# Patient Record
Sex: MALE | Race: OTHER | NOT HISPANIC OR LATINO
[De-identification: names, ages, dates, MRNs, and addresses within clinical notes are randomized per-mention and may not be internally consistent; named-entity substitution may affect disease eponyms.]

---

## 2018-07-18 ENCOUNTER — APPOINTMENT (OUTPATIENT)
Dept: INTERNAL MEDICINE | Facility: CLINIC | Age: 58
End: 2018-07-18
Payer: MEDICAID

## 2018-07-18 ENCOUNTER — NON-APPOINTMENT (OUTPATIENT)
Age: 58
End: 2018-07-18

## 2018-07-18 ENCOUNTER — LABORATORY RESULT (OUTPATIENT)
Age: 58
End: 2018-07-18

## 2018-07-18 VITALS
RESPIRATION RATE: 20 BRPM | DIASTOLIC BLOOD PRESSURE: 102 MMHG | OXYGEN SATURATION: 97 % | SYSTOLIC BLOOD PRESSURE: 150 MMHG

## 2018-07-18 VITALS — HEART RATE: 68 BPM

## 2018-07-18 VITALS — DIASTOLIC BLOOD PRESSURE: 110 MMHG | SYSTOLIC BLOOD PRESSURE: 152 MMHG

## 2018-07-18 VITALS — WEIGHT: 315 LBS | HEIGHT: 68 IN | BODY MASS INDEX: 47.74 KG/M2

## 2018-07-18 DIAGNOSIS — Z78.9 OTHER SPECIFIED HEALTH STATUS: ICD-10-CM

## 2018-07-18 DIAGNOSIS — Z12.5 ENCOUNTER FOR SCREENING FOR MALIGNANT NEOPLASM OF PROSTATE: ICD-10-CM

## 2018-07-18 DIAGNOSIS — Z82.49 FAMILY HISTORY OF ISCHEMIC HEART DISEASE AND OTHER DISEASES OF THE CIRCULATORY SYSTEM: ICD-10-CM

## 2018-07-18 DIAGNOSIS — Z72.3 LACK OF PHYSICAL EXERCISE: ICD-10-CM

## 2018-07-18 DIAGNOSIS — Z12.11 ENCOUNTER FOR SCREENING FOR MALIGNANT NEOPLASM OF COLON: ICD-10-CM

## 2018-07-18 PROCEDURE — 99203 OFFICE O/P NEW LOW 30 MIN: CPT | Mod: 25

## 2018-07-18 PROCEDURE — 93000 ELECTROCARDIOGRAM COMPLETE: CPT

## 2018-07-18 PROCEDURE — 99386 PREV VISIT NEW AGE 40-64: CPT | Mod: 25

## 2018-07-18 PROCEDURE — 36415 COLL VENOUS BLD VENIPUNCTURE: CPT

## 2018-07-18 NOTE — PHYSICAL EXAM
[No Acute Distress] : no acute distress [Well Nourished] : well nourished [Well Developed] : well developed [Well-Appearing] : well-appearing [Normal Sclera/Conjunctiva] : normal sclera/conjunctiva [PERRL] : pupils equal round and reactive to light [EOMI] : extraocular movements intact [Normal Outer Ear/Nose] : the outer ears and nose were normal in appearance [Normal Oropharynx] : the oropharynx was normal [Normal TMs] : both tympanic membranes were normal [Normal Nasal Mucosa] : the nasal mucosa was normal [No JVD] : no jugular venous distention [Supple] : supple [No Lymphadenopathy] : no lymphadenopathy [Thyroid Normal, No Nodules] : the thyroid was normal and there were no nodules present [No Respiratory Distress] : no respiratory distress  [Clear to Auscultation] : lungs were clear to auscultation bilaterally [No Accessory Muscle Use] : no accessory muscle use [Normal Rate] : normal rate  [Regular Rhythm] : with a regular rhythm [Normal S1, S2] : normal S1 and S2 [No Murmur] : no murmur heard [No Carotid Bruits] : no carotid bruits [No Abdominal Bruit] : a ~M bruit was not heard ~T in the abdomen [No Palpable Aorta] : no palpable aorta [Soft] : abdomen soft [Non Tender] : non-tender [Non-distended] : non-distended [No Masses] : no abdominal mass palpated [No HSM] : no HSM [Normal Bowel Sounds] : normal bowel sounds [Normal Posterior Cervical Nodes] : no posterior cervical lymphadenopathy [Normal Anterior Cervical Nodes] : no anterior cervical lymphadenopathy [Normal Inguinal Nodes] : no inguinal lymphadenopathy [No CVA Tenderness] : no CVA  tenderness [No Spinal Tenderness] : no spinal tenderness [No Joint Swelling] : no joint swelling [Grossly Normal Strength/Tone] : grossly normal strength/tone [No Rash] : no rash [Normal Gait] : normal gait [Coordination Grossly Intact] : coordination grossly intact [No Focal Deficits] : no focal deficits [Deep Tendon Reflexes (DTR)] : deep tendon reflexes were 2+ and symmetric [Speech Grossly Normal] : speech grossly normal [Memory Grossly Normal] : memory grossly normal [Normal Affect] : the affect was normal [Alert and Oriented x3] : oriented to person, place, and time [Normal Mood] : the mood was normal [Normal Insight/Judgement] : insight and judgment were intact [de-identified] : ? occ pause [de-identified] : +++ le edema, rt > lt [de-identified] : nl penis, rt testicular swelling.

## 2018-07-18 NOTE — ASSESSMENT
[FreeTextEntry1] : Discussed with the patient health care maintenance.  \par Discussed age and condition appropriate vaccinations.  \par Discussed age appropriate cancer screening testing as indicated, including colon cancer screening/colonoscopy, prostate cancer screening/PSA testing, testicular cancer screening/self exam and skin cancer screening.  \par Discussed protection from the sun.  \par Discussed healthy diet, exercise and weight control for health.\par Discussed healthy habits including abstaining from smoking and drugs and abstention/moderation with alcohol.\par Discussed routine regular ophthalmology and dental follow up.\par Routine labs discussed with the patient and sent. \par \par Spent 30 min face to face with the pt discussing and evaluating the htn, edema, fatigue, testicular swelling in addition to the time spent on the annual  PE and related probs.

## 2018-07-18 NOTE — HISTORY OF PRESENT ILLNESS
[FreeTextEntry1] : PE, obesity, fatigue, edema, testicular sx, glaucoma [de-identified] : 58 u/o male with a hx of obesity - s/p bariatric surg in the past, hx borderline htn and dm, glaucoma s/p surgery in the past here to establish care. \par  Asking for annual PE\par with some c/o.  reports le edema for 5-6 years.  Had been given  diuretics in the past.  Denies any bruce, pnd, orthopnea, cp, dizziness, palpitations.  no noted coughing or wheezing.  no le pain.  no noted weakness or numbness.  + occ le cramps.  some improvement with pedaling exercise bike.\par c/o fatigue.  reports that it is associated with the le edema.  Has been having for ~ 2 years.  constant.\par + wt gain.\par Reports intermittent rt testicular swelling and assoc tenderness.  no noted urinary sx.  \par no fevers, chills, sweats.  \par with some issues with eyes - has appt with ophtho\par with occ issues with the rt ear.  intermittent dec will.  \par \par prost-  overdue\par colon - overdue

## 2018-07-18 NOTE — REVIEW OF SYSTEMS
[Fatigue] : fatigue [Recent Change In Weight] : ~T recent weight change [Vision Problems] : vision problems [Hearing Loss] : hearing loss [Lower Ext Edema] : lower extremity edema [Depression] : depression [Negative] : Heme/Lymph [Fever] : no fever [Chills] : no chills [Night Sweats] : no night sweats [Discharge] : no discharge [Pain] : no pain [Redness] : no redness [Dryness] : no dryness [Itching] : no itching [Earache] : no earache [Nosebleeds] : no nosebleeds [Postnasal Drip] : no postnasal drip [Nasal Discharge] : no nasal discharge [Sore Throat] : no sore throat [Hoarseness] : no hoarseness [Chest Pain] : no chest pain [Palpitations] : no palpitations [Claudication] : no  leg claudication [Orthopena] : no orthopnea [Paroysmal Nocturnal Dyspnea] : no paroysmal nocturnal dyspnea [Skin Rash] : no skin rash [FreeTextEntry8] : testicular sx as noted. [FreeTextEntry9] : cramps as noted. [FreeTextEntry1] : no noted cold/heat intol.  no polyuria, polydipsia, polyphagia

## 2018-07-18 NOTE — HEALTH RISK ASSESSMENT
[No falls in past year] : Patient reported no falls in the past year [0] : 1) Little interest or pleasure doing things: Not at all (0) [1] : 2) Feeling down, depressed, or hopeless for several days (1) [None] : None [With Family] : lives with family [Employed] : employed [] :  [Feels Safe at Home] : Feels safe at home [Fully functional (bathing, dressing, toileting, transferring, walking, feeding)] : Fully functional (bathing, dressing, toileting, transferring, walking, feeding) [Fully functional (using the telephone, shopping, preparing meals, housekeeping, doing laundry, using] : Fully functional and needs no help or supervision to perform IADLs (using the telephone, shopping, preparing meals, housekeeping, doing laundry, using transportation, managing medications and managing finances) [Reports changes in hearing] : Reports changes in hearing [Reports changes in vision] : Reports changes in vision [Smoke Detector] : smoke detector [Carbon Monoxide Detector] : carbon monoxide detector [Seat Belt] :  uses seat belt [] : No [[id number]] : 1 [Change in mental status noted] : No change in mental status noted [Reports changes in dental health] : Reports no changes in dental health [Sunscreen] : does not use sunscreen

## 2018-07-19 ENCOUNTER — RESULT REVIEW (OUTPATIENT)
Age: 58
End: 2018-07-19

## 2018-07-23 LAB
25(OH)D3 SERPL-MCNC: 17.6 NG/ML
ALBUMIN SERPL ELPH-MCNC: 3.8 G/DL
ALP BLD-CCNC: 76 U/L
ALT SERPL-CCNC: 18 U/L
ANION GAP SERPL CALC-SCNC: 10 MMOL/L
AST SERPL-CCNC: 21 U/L
BASOPHILS # BLD AUTO: 0.01 K/UL
BASOPHILS NFR BLD AUTO: 0.1 %
BILIRUB SERPL-MCNC: 0.3 MG/DL
BUN SERPL-MCNC: 11 MG/DL
CALCIUM SERPL-MCNC: 8.2 MG/DL
CHLORIDE SERPL-SCNC: 100 MMOL/L
CHOLEST SERPL-MCNC: 197 MG/DL
CHOLEST/HDLC SERPL: 2.7 RATIO
CO2 SERPL-SCNC: 29 MMOL/L
CREAT SERPL-MCNC: 0.84 MG/DL
EOSINOPHIL # BLD AUTO: 0.2 K/UL
EOSINOPHIL NFR BLD AUTO: 2.6 %
FOLATE SERPL-MCNC: 8.7 NG/ML
GLUCOSE SERPL-MCNC: 112 MG/DL
HBA1C MFR BLD HPLC: 6.7 %
HCT VFR BLD CALC: 35.2 %
HDLC SERPL-MCNC: 74 MG/DL
HGB BLD-MCNC: 9.7 G/DL
IMM GRANULOCYTES NFR BLD AUTO: 0.1 %
IRON SATN MFR SERPL: 5 %
IRON SERPL-MCNC: 21 UG/DL
LDLC SERPL CALC-MCNC: 108 MG/DL
LYMPHOCYTES # BLD AUTO: 1.96 K/UL
LYMPHOCYTES NFR BLD AUTO: 26 %
MAGNESIUM SERPL-MCNC: 2.1 MG/DL
MAN DIFF?: NORMAL
MCHC RBC-ENTMCNC: 21.3 PG
MCHC RBC-ENTMCNC: 27.6 GM/DL
MCV RBC AUTO: 77.2 FL
MONOCYTES # BLD AUTO: 0.61 K/UL
MONOCYTES NFR BLD AUTO: 8.1 %
NEUTROPHILS # BLD AUTO: 4.76 K/UL
NEUTROPHILS NFR BLD AUTO: 63.1 %
PLATELET # BLD AUTO: 204 K/UL
POTASSIUM SERPL-SCNC: 4.9 MMOL/L
PROT SERPL-MCNC: 7 G/DL
PSA SERPL-MCNC: 0.19 NG/ML
RBC # BLD: 4.56 M/UL
RBC # FLD: 20.7 %
SODIUM SERPL-SCNC: 139 MMOL/L
TIBC SERPL-MCNC: 443 UG/DL
TRIGL SERPL-MCNC: 76 MG/DL
TSH SERPL-ACNC: 3.05 UIU/ML
UIBC SERPL-MCNC: 422 UG/DL
VIT B12 SERPL-MCNC: 200 PG/ML
WBC # FLD AUTO: 7.55 K/UL

## 2018-08-01 ENCOUNTER — APPOINTMENT (OUTPATIENT)
Dept: INTERNAL MEDICINE | Facility: CLINIC | Age: 58
End: 2018-08-01

## 2018-08-05 ENCOUNTER — APPOINTMENT (OUTPATIENT)
Dept: INTERNAL MEDICINE | Facility: CLINIC | Age: 58
End: 2018-08-05
Payer: MEDICAID

## 2018-08-05 VITALS — RESPIRATION RATE: 20 BRPM | DIASTOLIC BLOOD PRESSURE: 92 MMHG | HEART RATE: 76 BPM | SYSTOLIC BLOOD PRESSURE: 140 MMHG

## 2018-08-05 VITALS — BODY MASS INDEX: 47.74 KG/M2 | WEIGHT: 315 LBS | HEIGHT: 68 IN

## 2018-08-05 DIAGNOSIS — H40.9 UNSPECIFIED GLAUCOMA: ICD-10-CM

## 2018-08-05 PROCEDURE — 99214 OFFICE O/P EST MOD 30 MIN: CPT

## 2018-08-05 RX ORDER — MULTIVIT-MIN/FOLIC/VIT K/LYCOP 400-300MCG
50 MCG TABLET ORAL
Qty: 1 | Refills: 1 | Status: ACTIVE | COMMUNITY
Start: 2018-08-05 | End: 1900-01-01

## 2018-08-05 RX ORDER — FERROUS SULFATE TAB EC 325 MG (65 MG FE EQUIVALENT) 325 (65 FE) MG
325 (65 FE) TABLET DELAYED RESPONSE ORAL
Qty: 90 | Refills: 1 | Status: ACTIVE | COMMUNITY
Start: 2018-08-05 | End: 1900-01-01

## 2018-08-05 NOTE — PHYSICAL EXAM
[No Acute Distress] : no acute distress [Well Nourished] : well nourished [Well Developed] : well developed [Well-Appearing] : well-appearing [Normal Sclera/Conjunctiva] : normal sclera/conjunctiva [PERRL] : pupils equal round and reactive to light [EOMI] : extraocular movements intact [Normal Outer Ear/Nose] : the outer ears and nose were normal in appearance [Normal Oropharynx] : the oropharynx was normal [No JVD] : no jugular venous distention [Supple] : supple [No Lymphadenopathy] : no lymphadenopathy [Thyroid Normal, No Nodules] : the thyroid was normal and there were no nodules present [No Respiratory Distress] : no respiratory distress  [Clear to Auscultation] : lungs were clear to auscultation bilaterally [No Accessory Muscle Use] : no accessory muscle use [Normal Rate] : normal rate  [Regular Rhythm] : with a regular rhythm [Normal S1, S2] : normal S1 and S2 [No Murmur] : no murmur heard [No Carotid Bruits] : no carotid bruits [No Abdominal Bruit] : a ~M bruit was not heard ~T in the abdomen [No Palpable Aorta] : no palpable aorta [Soft] : abdomen soft [Non Tender] : non-tender [Non-distended] : non-distended [No Masses] : no abdominal mass palpated [No HSM] : no HSM [Normal Bowel Sounds] : normal bowel sounds [Normal Posterior Cervical Nodes] : no posterior cervical lymphadenopathy [Normal Anterior Cervical Nodes] : no anterior cervical lymphadenopathy [No CVA Tenderness] : no CVA  tenderness [No Spinal Tenderness] : no spinal tenderness [No Joint Swelling] : no joint swelling [Grossly Normal Strength/Tone] : grossly normal strength/tone [No Rash] : no rash [Normal Gait] : normal gait [Coordination Grossly Intact] : coordination grossly intact [No Focal Deficits] : no focal deficits [Deep Tendon Reflexes (DTR)] : deep tendon reflexes were 2+ and symmetric [Speech Grossly Normal] : speech grossly normal [Memory Grossly Normal] : memory grossly normal [Normal Affect] : the affect was normal [Alert and Oriented x3] : oriented to person, place, and time [Normal Mood] : the mood was normal [Normal Insight/Judgement] : insight and judgment were intact [de-identified] : ? occ pause [de-identified] : +++ le edema, rt > lt

## 2018-08-05 NOTE — REVIEW OF SYSTEMS
[Fatigue] : fatigue [Lower Ext Edema] : lower extremity edema [Negative] : Heme/Lymph [Fever] : no fever [Chills] : no chills [Night Sweats] : no night sweats [Recent Change In Weight] : ~T no recent weight change [Chest Pain] : no chest pain [Palpitations] : no palpitations [Claudication] : no  leg claudication [Orthopena] : no orthopnea [Paroysmal Nocturnal Dyspnea] : no paroysmal nocturnal dyspnea

## 2018-08-05 NOTE — HISTORY OF PRESENT ILLNESS
[FreeTextEntry1] : htn, gerd, obesity, Dm, glaucoma, edema, low iron, b12, and vit D [de-identified] : 58 u/o male with a hx of obesity - s/p bariatric surg in the past, hx htn and dm, glaucoma s/p surgery, edema, gerd.\par Started on HCTZ at last visit.\par Was found to have iron, b12 and vit d def on labs.\par also found to have a1c in the DM range.\par Has been taking hctz with some improvement of the le edema.\par Has not really changed the diet and exercise.\par no polyuria, polydipsia, polyphagia\par no noted cv sx.  no neuro sx\par GERD remains controlled.

## 2018-08-13 ENCOUNTER — APPOINTMENT (OUTPATIENT)
Dept: ULTRASOUND IMAGING | Facility: CLINIC | Age: 58
End: 2018-08-13

## 2018-08-28 ENCOUNTER — FORM ENCOUNTER (OUTPATIENT)
Age: 58
End: 2018-08-28

## 2018-08-29 ENCOUNTER — APPOINTMENT (OUTPATIENT)
Dept: ULTRASOUND IMAGING | Facility: CLINIC | Age: 58
End: 2018-08-29
Payer: MEDICAID

## 2018-08-29 ENCOUNTER — OUTPATIENT (OUTPATIENT)
Dept: OUTPATIENT SERVICES | Facility: HOSPITAL | Age: 58
LOS: 1 days | End: 2018-08-29
Payer: MEDICAID

## 2018-08-29 DIAGNOSIS — N50.89 OTHER SPECIFIED DISORDERS OF THE MALE GENITAL ORGANS: ICD-10-CM

## 2018-08-29 PROCEDURE — 76870 US EXAM SCROTUM: CPT

## 2018-08-29 PROCEDURE — 76870 US EXAM SCROTUM: CPT | Mod: 26

## 2018-09-05 ENCOUNTER — APPOINTMENT (OUTPATIENT)
Dept: INTERNAL MEDICINE | Facility: CLINIC | Age: 58
End: 2018-09-05
Payer: MEDICAID

## 2018-09-07 ENCOUNTER — APPOINTMENT (OUTPATIENT)
Dept: INTERNAL MEDICINE | Facility: CLINIC | Age: 58
End: 2018-09-07
Payer: MEDICAID

## 2018-09-07 ENCOUNTER — LABORATORY RESULT (OUTPATIENT)
Age: 58
End: 2018-09-07

## 2018-09-07 VITALS — HEART RATE: 80 BPM | DIASTOLIC BLOOD PRESSURE: 90 MMHG | SYSTOLIC BLOOD PRESSURE: 130 MMHG | RESPIRATION RATE: 14 BRPM

## 2018-09-07 VITALS — BODY MASS INDEX: 47.74 KG/M2 | WEIGHT: 315 LBS | HEIGHT: 68 IN

## 2018-09-07 PROCEDURE — 99214 OFFICE O/P EST MOD 30 MIN: CPT | Mod: 25

## 2018-09-07 PROCEDURE — 36415 COLL VENOUS BLD VENIPUNCTURE: CPT

## 2018-09-09 LAB
ALBUMIN SERPL ELPH-MCNC: 3.8 G/DL
ALP BLD-CCNC: 76 U/L
ALT SERPL-CCNC: 17 U/L
ANION GAP SERPL CALC-SCNC: 12 MMOL/L
AST SERPL-CCNC: 19 U/L
BASOPHILS # BLD AUTO: 0.05 K/UL
BASOPHILS NFR BLD AUTO: 0.9 %
BILIRUB SERPL-MCNC: 0.3 MG/DL
BUN SERPL-MCNC: 13 MG/DL
CALCIUM SERPL-MCNC: 8.8 MG/DL
CHLORIDE SERPL-SCNC: 101 MMOL/L
CO2 SERPL-SCNC: 27 MMOL/L
CREAT SERPL-MCNC: 0.89 MG/DL
EOSINOPHIL # BLD AUTO: 0.24 K/UL
EOSINOPHIL NFR BLD AUTO: 4.3 %
FERRITIN SERPL-MCNC: 27 NG/ML
GLUCOSE SERPL-MCNC: 98 MG/DL
HCT VFR BLD CALC: 37.4 %
HGB BLD-MCNC: 10.5 G/DL
IRON SATN MFR SERPL: 5 %
IRON SERPL-MCNC: 20 UG/DL
LYMPHOCYTES # BLD AUTO: 1.62 K/UL
LYMPHOCYTES NFR BLD AUTO: 28.4 %
MAN DIFF?: NORMAL
MCHC RBC-ENTMCNC: 22.6 PG
MCHC RBC-ENTMCNC: 28.1 GM/DL
MCV RBC AUTO: 80.6 FL
MONOCYTES # BLD AUTO: 0.15 K/UL
MONOCYTES NFR BLD AUTO: 2.6 %
NEUTROPHILS # BLD AUTO: 3.63 K/UL
NEUTROPHILS NFR BLD AUTO: 63.8 %
PLATELET # BLD AUTO: 202 K/UL
POTASSIUM SERPL-SCNC: 5 MMOL/L
PROT SERPL-MCNC: 7.1 G/DL
RBC # BLD: 4.64 M/UL
RBC # FLD: 23.2 %
SODIUM SERPL-SCNC: 140 MMOL/L
TIBC SERPL-MCNC: 412 UG/DL
UIBC SERPL-MCNC: 392 UG/DL
VIT B12 SERPL-MCNC: 241 PG/ML
WBC # FLD AUTO: 5.69 K/UL

## 2018-09-09 NOTE — CONSULT LETTER
[Dear  ___] : Dear  [unfilled], [Consult Letter:] : I had the pleasure of evaluating your patient, [unfilled]. [Please see my note below.] : Please see my note below. [Consult Closing:] : Thank you very much for allowing me to participate in the care of this patient.  If you have any questions, please do not hesitate to contact me. [FreeTextEntry1] : Andry Cabello Md

## 2018-09-09 NOTE — ASSESSMENT
[Other: _____] : [unfilled] [As per surgery] : as per surgery [Patient Optimized for Surgery] : Patient optimized for surgery [FreeTextEntry4] : 58 u/o male with a hx of obesity - s/p bariatric surg in the past, hx htn and dm, glaucoma s/p surgery, edema, gerd, b12 def, vit D def, iron def.\par Has been taking the meds and supplementation.  \par He will be having upcoming surgery for retinal detachment on the right.  \par diet has been a bit better.  Has been exercising - going on exercise bike.  \par Some wt loss.  \par no noted cv sx except the improved le edema.  no cp, palpitations, dizziness, bruce, pnd, orthopnea.  Has been riding on exercise bike.  Has been walknig a lot better\par no noted bleeding, bruising, clotting probs.  \par His blood pressure is improved, but remains high.  The blood pressure meds have been adjusted.\par follow up labs were sent including cbc and cmp given the recent change in meds.\par If cbc and cmp acceptable, there would be no medical contraindications for the planed procedure.

## 2018-09-09 NOTE — HISTORY OF PRESENT ILLNESS
[FreeTextEntry1] : repair of retinal detachment [FreeTextEntry2] : 9/17/18 [FreeTextEntry3] : Dr Polk [FreeTextEntry4] : 58 u/o male with a hx of obesity - s/p bariatric surg in the past, hx htn and dm, glaucoma s/p surgery, edema, gerd, b12 def, vit D def, iron def.\par Has been taking the meds and supplementation.  \par He will be having upcoming surgery for retinal detachment on the right.  With continued blurry vision at the right eye.  \par diet has been a bit better.  Has been exercising.  \par Some wt loss.  \par no polyuria, polydipsia, polyphagia\par no noted cv sx except the improved le edema.  no cp, palpitations, dizziness, bruce, pnd, orthopnea.  Has been riding on exercise bike.  Has been walknig a lot better\par no noted bleeding, bruising, clotting probs.  \par no neuro sx\par GERD remains controlled.

## 2018-09-09 NOTE — PHYSICAL EXAM
[No Acute Distress] : no acute distress [Well Nourished] : well nourished [Well Developed] : well developed [Well-Appearing] : well-appearing [Normal Sclera/Conjunctiva] : normal sclera/conjunctiva [PERRL] : pupils equal round and reactive to light [EOMI] : extraocular movements intact [Normal Outer Ear/Nose] : the outer ears and nose were normal in appearance [Normal Oropharynx] : the oropharynx was normal [No JVD] : no jugular venous distention [Supple] : supple [No Lymphadenopathy] : no lymphadenopathy [Thyroid Normal, No Nodules] : the thyroid was normal and there were no nodules present [No Respiratory Distress] : no respiratory distress  [Clear to Auscultation] : lungs were clear to auscultation bilaterally [No Accessory Muscle Use] : no accessory muscle use [Normal Rate] : normal rate  [Regular Rhythm] : with a regular rhythm [Normal S1, S2] : normal S1 and S2 [No Murmur] : no murmur heard [No Carotid Bruits] : no carotid bruits [No Abdominal Bruit] : a ~M bruit was not heard ~T in the abdomen [No Palpable Aorta] : no palpable aorta [Soft] : abdomen soft [Non Tender] : non-tender [Non-distended] : non-distended [No Masses] : no abdominal mass palpated [No HSM] : no HSM [Normal Bowel Sounds] : normal bowel sounds [Normal Posterior Cervical Nodes] : no posterior cervical lymphadenopathy [Normal Anterior Cervical Nodes] : no anterior cervical lymphadenopathy [No CVA Tenderness] : no CVA  tenderness [No Spinal Tenderness] : no spinal tenderness [No Joint Swelling] : no joint swelling [Grossly Normal Strength/Tone] : grossly normal strength/tone [Normal Gait] : normal gait [Coordination Grossly Intact] : coordination grossly intact [No Focal Deficits] : no focal deficits [Deep Tendon Reflexes (DTR)] : deep tendon reflexes were 2+ and symmetric [Speech Grossly Normal] : speech grossly normal [Memory Grossly Normal] : memory grossly normal [Normal Affect] : the affect was normal [Alert and Oriented x3] : oriented to person, place, and time [Normal Mood] : the mood was normal [Normal Insight/Judgement] : insight and judgment were intact [de-identified] : +++ le edema, rt > lt [de-identified] : cyst at the rt abd wall.  no tenderness or erythema.  No drainage.

## 2018-09-09 NOTE — REVIEW OF SYSTEMS
[Vision Problems] : vision problems [Lower Ext Edema] : lower extremity edema [Negative] : Heme/Lymph [Fever] : no fever [Chills] : no chills [Fatigue] : no fatigue [Night Sweats] : no night sweats [Recent Change In Weight] : ~T no recent weight change [Discharge] : no discharge [Pain] : no pain [Redness] : no redness [Dryness] : no dryness [Itching] : no itching [Chest Pain] : no chest pain [Palpitations] : no palpitations [Claudication] : no  leg claudication [Orthopena] : no orthopnea [Paroysmal Nocturnal Dyspnea] : no paroysmal nocturnal dyspnea

## 2018-10-08 ENCOUNTER — APPOINTMENT (OUTPATIENT)
Dept: CHRONIC DISEASE MANAGEMENT | Facility: CLINIC | Age: 58
End: 2018-10-08

## 2018-10-16 ENCOUNTER — APPOINTMENT (OUTPATIENT)
Dept: CARDIOLOGY | Facility: CLINIC | Age: 58
End: 2018-10-16
Payer: MEDICAID

## 2018-10-16 ENCOUNTER — NON-APPOINTMENT (OUTPATIENT)
Age: 58
End: 2018-10-16

## 2018-10-16 VITALS
BODY MASS INDEX: 47.74 KG/M2 | OXYGEN SATURATION: 98 % | SYSTOLIC BLOOD PRESSURE: 150 MMHG | WEIGHT: 315 LBS | HEIGHT: 68 IN | HEART RATE: 59 BPM | DIASTOLIC BLOOD PRESSURE: 90 MMHG

## 2018-10-16 DIAGNOSIS — Z82.49 FAMILY HISTORY OF ISCHEMIC HEART DISEASE AND OTHER DISEASES OF THE CIRCULATORY SYSTEM: ICD-10-CM

## 2018-10-16 PROCEDURE — 99204 OFFICE O/P NEW MOD 45 MIN: CPT

## 2018-10-16 PROCEDURE — 93000 ELECTROCARDIOGRAM COMPLETE: CPT

## 2018-10-19 ENCOUNTER — APPOINTMENT (OUTPATIENT)
Dept: CARDIOLOGY | Facility: CLINIC | Age: 58
End: 2018-10-19
Payer: MEDICAID

## 2018-10-19 PROCEDURE — 93306 TTE W/DOPPLER COMPLETE: CPT

## 2018-11-07 ENCOUNTER — APPOINTMENT (OUTPATIENT)
Dept: INTERNAL MEDICINE | Facility: CLINIC | Age: 58
End: 2018-11-07

## 2018-11-13 ENCOUNTER — APPOINTMENT (OUTPATIENT)
Dept: ELECTROPHYSIOLOGY | Facility: CLINIC | Age: 58
End: 2018-11-13

## 2018-11-16 ENCOUNTER — RX RENEWAL (OUTPATIENT)
Age: 58
End: 2018-11-16

## 2018-12-03 ENCOUNTER — APPOINTMENT (OUTPATIENT)
Dept: PULMONOLOGY | Facility: CLINIC | Age: 58
End: 2018-12-03

## 2019-01-14 ENCOUNTER — RX RENEWAL (OUTPATIENT)
Age: 59
End: 2019-01-14

## 2019-02-04 ENCOUNTER — APPOINTMENT (OUTPATIENT)
Dept: INTERNAL MEDICINE | Facility: CLINIC | Age: 59
End: 2019-02-04
Payer: MEDICAID

## 2019-02-04 VITALS — WEIGHT: 315 LBS | BODY MASS INDEX: 47.74 KG/M2 | HEIGHT: 68 IN

## 2019-02-04 VITALS — HEART RATE: 58 BPM | RESPIRATION RATE: 12 BRPM | SYSTOLIC BLOOD PRESSURE: 124 MMHG | DIASTOLIC BLOOD PRESSURE: 80 MMHG

## 2019-02-04 PROCEDURE — 36415 COLL VENOUS BLD VENIPUNCTURE: CPT

## 2019-02-04 PROCEDURE — 99214 OFFICE O/P EST MOD 30 MIN: CPT | Mod: 25

## 2019-02-04 PROCEDURE — 90674 CCIIV4 VAC NO PRSV 0.5 ML IM: CPT

## 2019-02-04 PROCEDURE — G0008: CPT

## 2019-02-04 NOTE — PHYSICAL EXAM
[No Acute Distress] : no acute distress [Well Nourished] : well nourished [Well Developed] : well developed [Well-Appearing] : well-appearing [Normal Sclera/Conjunctiva] : normal sclera/conjunctiva [PERRL] : pupils equal round and reactive to light [EOMI] : extraocular movements intact [Normal Outer Ear/Nose] : the outer ears and nose were normal in appearance [Normal Oropharynx] : the oropharynx was normal [No JVD] : no jugular venous distention [Supple] : supple [No Lymphadenopathy] : no lymphadenopathy [Thyroid Normal, No Nodules] : the thyroid was normal and there were no nodules present [No Respiratory Distress] : no respiratory distress  [Clear to Auscultation] : lungs were clear to auscultation bilaterally [No Accessory Muscle Use] : no accessory muscle use [Normal Rate] : normal rate  [Regular Rhythm] : with a regular rhythm [Normal S1, S2] : normal S1 and S2 [No Murmur] : no murmur heard [No Carotid Bruits] : no carotid bruits [No Abdominal Bruit] : a ~M bruit was not heard ~T in the abdomen [No Palpable Aorta] : no palpable aorta [Soft] : abdomen soft [Non Tender] : non-tender [Non-distended] : non-distended [No Masses] : no abdominal mass palpated [No HSM] : no HSM [Normal Bowel Sounds] : normal bowel sounds [Normal Posterior Cervical Nodes] : no posterior cervical lymphadenopathy [Normal Anterior Cervical Nodes] : no anterior cervical lymphadenopathy [No CVA Tenderness] : no CVA  tenderness [No Spinal Tenderness] : no spinal tenderness [No Joint Swelling] : no joint swelling [Grossly Normal Strength/Tone] : grossly normal strength/tone [Normal Gait] : normal gait [Coordination Grossly Intact] : coordination grossly intact [No Focal Deficits] : no focal deficits [Deep Tendon Reflexes (DTR)] : deep tendon reflexes were 2+ and symmetric [Speech Grossly Normal] : speech grossly normal [Memory Grossly Normal] : memory grossly normal [Normal Affect] : the affect was normal [Alert and Oriented x3] : oriented to person, place, and time [Normal Mood] : the mood was normal [Normal Insight/Judgement] : insight and judgment were intact [de-identified] : 2-3+le edema

## 2019-02-04 NOTE — HEALTH RISK ASSESSMENT
[0] : 1) Little interest or pleasure doing things: Not at all (0) [1] : 2) Feeling down, depressed, or hopeless for several days (1) [] : No [[id number]] : 0

## 2019-02-04 NOTE — HISTORY OF PRESENT ILLNESS
[FreeTextEntry1] : dm, htn, gerd, obesity, edema, b12/iron/vit d def [de-identified] : 58 u/o male with a hx of obesity - s/p bariatric surg in the past, hx htn and dm, glaucoma s/p surgery, edema, gerd, b12 def, vit D def, iron def.\par Has been taking the meds and supplementation. \par He had surgery for retinal detachment on the right. With continued blurry vision at the right eye. Wants to change ophtho.\par diet has been a bit better. Has been exercising with cycling nightly.\par Some wt loss. \par no polyuria, polydipsia, polyphagia\par no noted cv sx except the improved le edema. no cp, palpitations, dizziness, bruce, pnd, orthopnea. \par no noted bleeding, bruising. \par no neuro sx\par GERD remains controlled. \par Has been having some knee/ankle pains.  \par occ feels depressed when sitting alone in the store.  situational - no noted anhedonia.\par \par for flu vaccine\par \par colon - has never had.

## 2019-02-04 NOTE — REVIEW OF SYSTEMS
[Vision Problems] : vision problems [Lower Ext Edema] : lower extremity edema [Joint Pain] : joint pain [Negative] : Heme/Lymph [Fever] : no fever [Chills] : no chills [Fatigue] : no fatigue [Night Sweats] : no night sweats [Recent Change In Weight] : ~T no recent weight change [Discharge] : no discharge [Pain] : no pain [Redness] : no redness [Dryness] : no dryness [Itching] : no itching [Chest Pain] : no chest pain [Palpitations] : no palpitations [Claudication] : no  leg claudication [Orthopena] : no orthopnea [Paroysmal Nocturnal Dyspnea] : no paroysmal nocturnal dyspnea [Joint Stiffness] : no joint stiffness [Muscle Pain] : no muscle pain [Muscle Weakness] : no muscle weakness [Back Pain] : no back pain [Joint Swelling] : no joint swelling [Suicidal] : not suicidal

## 2019-02-06 LAB
25(OH)D3 SERPL-MCNC: 25.9 NG/ML
ALBUMIN SERPL ELPH-MCNC: 3.9 G/DL
ALP BLD-CCNC: 73 U/L
ALT SERPL-CCNC: 16 U/L
ANION GAP SERPL CALC-SCNC: 10 MMOL/L
AST SERPL-CCNC: 18 U/L
BASOPHILS # BLD AUTO: 0.03 K/UL
BASOPHILS NFR BLD AUTO: 0.4 %
BILIRUB SERPL-MCNC: 0.2 MG/DL
BUN SERPL-MCNC: 17 MG/DL
CALCIUM SERPL-MCNC: 8.8 MG/DL
CHLORIDE SERPL-SCNC: 97 MMOL/L
CHOLEST SERPL-MCNC: 219 MG/DL
CHOLEST/HDLC SERPL: 3.4 RATIO
CO2 SERPL-SCNC: 30 MMOL/L
CREAT SERPL-MCNC: 0.93 MG/DL
CREAT SPEC-SCNC: 77 MG/DL
EOSINOPHIL # BLD AUTO: 0.26 K/UL
EOSINOPHIL NFR BLD AUTO: 3.5 %
FERRITIN SERPL-MCNC: 29 NG/ML
FOLATE SERPL-MCNC: 13.1 NG/ML
GLUCOSE SERPL-MCNC: 136 MG/DL
HBA1C MFR BLD HPLC: 6.9 %
HCT VFR BLD CALC: 42.7 %
HDLC SERPL-MCNC: 64 MG/DL
HGB BLD-MCNC: 12.8 G/DL
IMM GRANULOCYTES NFR BLD AUTO: 0.1 %
IRON SATN MFR SERPL: 9 %
IRON SERPL-MCNC: 42 UG/DL
LDLC SERPL CALC-MCNC: 138 MG/DL
LYMPHOCYTES # BLD AUTO: 1.95 K/UL
LYMPHOCYTES NFR BLD AUTO: 26.5 %
MAN DIFF?: NORMAL
MCHC RBC-ENTMCNC: 26 PG
MCHC RBC-ENTMCNC: 30 GM/DL
MCV RBC AUTO: 86.8 FL
MICROALBUMIN 24H UR DL<=1MG/L-MCNC: <1.2 MG/DL
MICROALBUMIN/CREAT 24H UR-RTO: NORMAL
MONOCYTES # BLD AUTO: 0.63 K/UL
MONOCYTES NFR BLD AUTO: 8.5 %
NEUTROPHILS # BLD AUTO: 4.49 K/UL
NEUTROPHILS NFR BLD AUTO: 61 %
PLATELET # BLD AUTO: 173 K/UL
POTASSIUM SERPL-SCNC: 4.5 MMOL/L
PROT SERPL-MCNC: 7.1 G/DL
RBC # BLD: 4.92 M/UL
RBC # FLD: 16.7 %
SODIUM SERPL-SCNC: 138 MMOL/L
TIBC SERPL-MCNC: 445 UG/DL
TRIGL SERPL-MCNC: 84 MG/DL
UIBC SERPL-MCNC: 403 UG/DL
VIT B12 SERPL-MCNC: 274 PG/ML
WBC # FLD AUTO: 7.37 K/UL

## 2019-02-06 RX ORDER — ATORVASTATIN CALCIUM 20 MG/1
20 TABLET, FILM COATED ORAL
Qty: 1 | Refills: 3 | Status: ACTIVE | COMMUNITY
Start: 2019-02-06 | End: 1900-01-01

## 2019-02-12 ENCOUNTER — RX RENEWAL (OUTPATIENT)
Age: 59
End: 2019-02-12

## 2019-02-27 ENCOUNTER — APPOINTMENT (OUTPATIENT)
Dept: OPHTHALMOLOGY | Facility: CLINIC | Age: 59
End: 2019-02-27

## 2019-03-17 ENCOUNTER — RX RENEWAL (OUTPATIENT)
Age: 59
End: 2019-03-17

## 2019-03-21 ENCOUNTER — APPOINTMENT (OUTPATIENT)
Dept: CHRONIC DISEASE MANAGEMENT | Facility: CLINIC | Age: 59
End: 2019-03-21
Payer: MEDICAID

## 2019-03-21 VITALS — WEIGHT: 315 LBS | BODY MASS INDEX: 57.63 KG/M2

## 2019-03-21 PROCEDURE — 97802 MEDICAL NUTRITION INDIV IN: CPT

## 2019-04-01 ENCOUNTER — APPOINTMENT (OUTPATIENT)
Dept: SURGERY | Facility: CLINIC | Age: 59
End: 2019-04-01
Payer: MEDICAID

## 2019-04-01 VITALS
OXYGEN SATURATION: 95 % | WEIGHT: 315 LBS | HEIGHT: 68 IN | BODY MASS INDEX: 47.74 KG/M2 | TEMPERATURE: 98.1 F | DIASTOLIC BLOOD PRESSURE: 91 MMHG | RESPIRATION RATE: 16 BRPM | SYSTOLIC BLOOD PRESSURE: 149 MMHG | HEART RATE: 54 BPM

## 2019-04-01 DIAGNOSIS — E66.01 MORBID (SEVERE) OBESITY DUE TO EXCESS CALORIES: ICD-10-CM

## 2019-04-01 PROCEDURE — 99205 OFFICE O/P NEW HI 60 MIN: CPT

## 2019-04-01 NOTE — PLAN
[FreeTextEntry1] : Weight loss surgery if clinically indicated.  The risks, benefits and alternatives, including conversion to laparoscopic gastric bypass, or revision laparoscopic vertical sleeve gastrectomy, were discussed at length and all of his questions were answered.  The patient appears to understand and wishes to proceed.\par \par The patient was given the following instructions:\par \par 1.	He needs a complete medical evaluation including echocardiogram, stress test, pulmonary function test and evaluation for sleep apnea.\par \par 2.	He needs an upper endoscopy.\par \par 3.	He needs dietary and psychological evaluations.\par \par 4.	He must attend a preoperative support group meeting.\par \par 5.	He must undergo a right upper quadrant ultrasound.\par \par The patient clearly understood that surgery would only be scheduled if there are no medical or psychiatric contraindications and a second office visit is required.  \par \par A determination about surgery will be made after UGI.\par

## 2019-04-01 NOTE — CONSULT LETTER
[Dear  ___] : Dear  [unfilled], [Consult Letter:] : I had the pleasure of evaluating your patient, [unfilled]. [Please see my note below.] : Please see my note below. [Consult Closing:] : Thank you very much for allowing me to participate in the care of this patient.  If you have any questions, please do not hesitate to contact me. [Sincerely,] : Sincerely, [FreeTextEntry3] : Jimi Lemus MD, FACS, FASMBS\par , Department of Surgery [facility]\par Director of Metabolic and Bariatric Surgery, and Robotic Minimally Invasive Surgery at [facility]

## 2019-04-01 NOTE — PHYSICAL EXAM
[Normal] : affect appropriate [Obese, well nourished, in no acute distress] : obese, well nourished, in no acute distress [de-identified] : Normoactive bowel sounds, no hepatosplenomegaly, no masses, non-tender.  healed wounds, no hernia

## 2019-04-01 NOTE — PROCEDURE
[FreeTextEntry1] : 58 year old male who had VSG in 2008 in [place]. Initially lost 110# and gained half of it back.Looking for advice on revisional surgery . Pt to start 6 month weight management program

## 2019-04-01 NOTE — REVIEW OF SYSTEMS
[Joint Pain] : joint pain [Negative] : Allergic/Immunologic [Patient Intake Form Reviewed] : Patient intake form was reviewed

## 2019-04-01 NOTE — ASSESSMENT
[FreeTextEntry1] : The patient is a morbidly obese man, (BMI= 56), status post [place] gast[place] with significant weight related comorbidity including: Weightbearing joint pain. Hypertension and probable obstructive sleep apnea; unable to lose weight and improve his co-morbid conditions with medical management including diet, exercise and weight loss medication.

## 2019-04-01 NOTE — HISTORY OF PRESENT ILLNESS
[de-identified] : Abrahan is a 57 y/o male here for weight loss consultation. \par Patient had laparoscopic sleeve gastrectomy done in [place] (2008). \par  [de-identified] : The patient is a 58 year-old morbidly obese man, 5 feet 8 inches 373 pounds (BMI= 56).  The patient presents requesting weight loss surgery.  He has been more than 100 lb. overweight for the past 20 years and is currently 30 pounds less than his greatest weight. \par \par The patient has tried numerous weight loss programs including laparoscopic vertical sleeve gastrectomy [place] and self-directed and physician supervised diets. Patient has not taken weight loss medication due to known side effects. Patient has lost up to 160 pounds pounds with sleeve gastrectomy and regained most of that weight.\par \par The patient denies diabetes, shortness of breath with exertion, significant weight bearing joint pain, and low back pain.  He denies kidney, urinary tract disease, headaches, dizziness, seizure or neurological disorder.  He denies untreated thyroid, adrenal, pituitary disease, depression or psychiatric disorder.  The patient denies gallstones, heartburn, ulcer or liver disease.  He has high blood pressure, but denies high cholesterol, heart attack or stroke.  He denies anemia, bleeding disorder, thrombosis, clotting disorder or easy bruisability.  He denies peripheral edema and complains of snoring, daytime somnolence, and frequent awakening.  He denies erectile dysfunction.\par \par

## 2019-04-04 ENCOUNTER — APPOINTMENT (OUTPATIENT)
Dept: CHRONIC DISEASE MANAGEMENT | Facility: CLINIC | Age: 59
End: 2019-04-04

## 2019-05-28 ENCOUNTER — APPOINTMENT (OUTPATIENT)
Dept: INTERNAL MEDICINE | Facility: CLINIC | Age: 59
End: 2019-05-28

## 2019-05-28 ENCOUNTER — INPATIENT (INPATIENT)
Facility: HOSPITAL | Age: 59
LOS: 2 days | Discharge: ANOTHER HOSPITAL | End: 2019-05-31
Attending: INTERNAL MEDICINE | Admitting: INTERNAL MEDICINE
Payer: MEDICAID

## 2019-05-28 VITALS
WEIGHT: 315 LBS | OXYGEN SATURATION: 100 % | HEART RATE: 49 BPM | SYSTOLIC BLOOD PRESSURE: 140 MMHG | TEMPERATURE: 98 F | HEIGHT: 67 IN | DIASTOLIC BLOOD PRESSURE: 80 MMHG | RESPIRATION RATE: 19 BRPM

## 2019-05-28 DIAGNOSIS — R55 SYNCOPE AND COLLAPSE: ICD-10-CM

## 2019-05-28 DIAGNOSIS — R06.02 SHORTNESS OF BREATH: ICD-10-CM

## 2019-05-28 DIAGNOSIS — Z98.84 BARIATRIC SURGERY STATUS: Chronic | ICD-10-CM

## 2019-05-28 DIAGNOSIS — I10 ESSENTIAL (PRIMARY) HYPERTENSION: ICD-10-CM

## 2019-05-28 DIAGNOSIS — Z29.9 ENCOUNTER FOR PROPHYLACTIC MEASURES, UNSPECIFIED: ICD-10-CM

## 2019-05-28 DIAGNOSIS — E66.01 MORBID (SEVERE) OBESITY DUE TO EXCESS CALORIES: ICD-10-CM

## 2019-05-28 DIAGNOSIS — I44.1 ATRIOVENTRICULAR BLOCK, SECOND DEGREE: ICD-10-CM

## 2019-05-28 LAB
ALBUMIN SERPL ELPH-MCNC: 2.8 G/DL — LOW (ref 3.3–5)
ALP SERPL-CCNC: 77 U/L — SIGNIFICANT CHANGE UP (ref 40–120)
ALT FLD-CCNC: 20 U/L — SIGNIFICANT CHANGE UP (ref 12–78)
ANION GAP SERPL CALC-SCNC: 6 MMOL/L — SIGNIFICANT CHANGE UP (ref 5–17)
APTT BLD: 19.4 SEC — LOW (ref 28.5–37)
AST SERPL-CCNC: 18 U/L — SIGNIFICANT CHANGE UP (ref 15–37)
BASE EXCESS BLDA CALC-SCNC: 3.8 MMOL/L — HIGH (ref -2–2)
BILIRUB SERPL-MCNC: 0.4 MG/DL — SIGNIFICANT CHANGE UP (ref 0.2–1.2)
BLOOD GAS COMMENTS: SIGNIFICANT CHANGE UP
BLOOD GAS SOURCE: SIGNIFICANT CHANGE UP
BUN SERPL-MCNC: 16 MG/DL — SIGNIFICANT CHANGE UP (ref 7–23)
CALCIUM SERPL-MCNC: 8.6 MG/DL — SIGNIFICANT CHANGE UP (ref 8.5–10.1)
CHLORIDE SERPL-SCNC: 106 MMOL/L — SIGNIFICANT CHANGE UP (ref 96–108)
CK MB BLD-MCNC: 0.9 % — SIGNIFICANT CHANGE UP (ref 0–3.5)
CK MB CFR SERPL CALC: 1.1 NG/ML — SIGNIFICANT CHANGE UP (ref 0.5–3.6)
CK SERPL-CCNC: 120 U/L — SIGNIFICANT CHANGE UP (ref 26–308)
CO2 SERPL-SCNC: 28 MMOL/L — SIGNIFICANT CHANGE UP (ref 22–31)
CREAT SERPL-MCNC: 1.14 MG/DL — SIGNIFICANT CHANGE UP (ref 0.5–1.3)
GLUCOSE BLDC GLUCOMTR-MCNC: 110 MG/DL — HIGH (ref 70–99)
GLUCOSE SERPL-MCNC: 130 MG/DL — HIGH (ref 70–99)
HCO3 BLDA-SCNC: 30 MMOL/L — HIGH (ref 21–29)
HCT VFR BLD CALC: 41.4 % — SIGNIFICANT CHANGE UP (ref 39–50)
HCT VFR BLD CALC: 43.4 % — SIGNIFICANT CHANGE UP (ref 39–50)
HGB BLD-MCNC: 12.8 G/DL — LOW (ref 13–17)
HGB BLD-MCNC: 13.5 G/DL — SIGNIFICANT CHANGE UP (ref 13–17)
HOROWITZ INDEX BLDA+IHG-RTO: 21 — SIGNIFICANT CHANGE UP
INR BLD: 1.15 RATIO — SIGNIFICANT CHANGE UP (ref 0.88–1.16)
MCHC RBC-ENTMCNC: 27.7 PG — SIGNIFICANT CHANGE UP (ref 27–34)
MCHC RBC-ENTMCNC: 28 PG — SIGNIFICANT CHANGE UP (ref 27–34)
MCHC RBC-ENTMCNC: 30.9 GM/DL — LOW (ref 32–36)
MCHC RBC-ENTMCNC: 31.1 GM/DL — LOW (ref 32–36)
MCV RBC AUTO: 89.6 FL — SIGNIFICANT CHANGE UP (ref 80–100)
MCV RBC AUTO: 89.9 FL — SIGNIFICANT CHANGE UP (ref 80–100)
NRBC # BLD: 0 /100 WBCS — SIGNIFICANT CHANGE UP (ref 0–0)
NRBC # BLD: 0 /100 WBCS — SIGNIFICANT CHANGE UP (ref 0–0)
PCO2 BLDA: 55 MMHG — HIGH (ref 32–46)
PH BLD: 7.36 — SIGNIFICANT CHANGE UP (ref 7.35–7.45)
PLATELET # BLD AUTO: 137 K/UL — LOW (ref 150–400)
PLATELET # BLD AUTO: 146 K/UL — LOW (ref 150–400)
PO2 BLDA: 58 MMHG — LOW (ref 74–108)
POTASSIUM SERPL-MCNC: 4.5 MMOL/L — SIGNIFICANT CHANGE UP (ref 3.5–5.3)
POTASSIUM SERPL-SCNC: 4.5 MMOL/L — SIGNIFICANT CHANGE UP (ref 3.5–5.3)
PROT SERPL-MCNC: 7.3 GM/DL — SIGNIFICANT CHANGE UP (ref 6–8.3)
PROTHROM AB SERPL-ACNC: 12.9 SEC — SIGNIFICANT CHANGE UP (ref 10–12.9)
RBC # BLD: 4.62 M/UL — SIGNIFICANT CHANGE UP (ref 4.2–5.8)
RBC # BLD: 4.83 M/UL — SIGNIFICANT CHANGE UP (ref 4.2–5.8)
RBC # FLD: 15 % — HIGH (ref 10.3–14.5)
RBC # FLD: 15.1 % — HIGH (ref 10.3–14.5)
SAO2 % BLDA: 87 % — LOW (ref 92–96)
SODIUM SERPL-SCNC: 140 MMOL/L — SIGNIFICANT CHANGE UP (ref 135–145)
TROPONIN I SERPL-MCNC: <.015 NG/ML — SIGNIFICANT CHANGE UP (ref 0.01–0.04)
WBC # BLD: 8.87 K/UL — SIGNIFICANT CHANGE UP (ref 3.8–10.5)
WBC # BLD: 9.33 K/UL — SIGNIFICANT CHANGE UP (ref 3.8–10.5)
WBC # FLD AUTO: 8.87 K/UL — SIGNIFICANT CHANGE UP (ref 3.8–10.5)
WBC # FLD AUTO: 9.33 K/UL — SIGNIFICANT CHANGE UP (ref 3.8–10.5)

## 2019-05-28 PROCEDURE — 99223 1ST HOSP IP/OBS HIGH 75: CPT

## 2019-05-28 PROCEDURE — 93010 ELECTROCARDIOGRAM REPORT: CPT

## 2019-05-28 PROCEDURE — 71045 X-RAY EXAM CHEST 1 VIEW: CPT | Mod: 26

## 2019-05-28 PROCEDURE — 99285 EMERGENCY DEPT VISIT HI MDM: CPT

## 2019-05-28 RX ORDER — LISINOPRIL 2.5 MG/1
10 TABLET ORAL DAILY
Refills: 0 | Status: DISCONTINUED | OUTPATIENT
Start: 2019-05-28 | End: 2019-05-31

## 2019-05-28 RX ORDER — HEPARIN SODIUM 5000 [USP'U]/ML
5000 INJECTION INTRAVENOUS; SUBCUTANEOUS EVERY 8 HOURS
Refills: 0 | Status: DISCONTINUED | OUTPATIENT
Start: 2019-05-28 | End: 2019-05-31

## 2019-05-28 RX ORDER — FUROSEMIDE 40 MG
40 TABLET ORAL DAILY
Refills: 0 | Status: DISCONTINUED | OUTPATIENT
Start: 2019-05-28 | End: 2019-05-31

## 2019-05-28 RX ORDER — HYDROCHLOROTHIAZIDE 25 MG
25 TABLET ORAL DAILY
Refills: 0 | Status: DISCONTINUED | OUTPATIENT
Start: 2019-05-28 | End: 2019-05-31

## 2019-05-28 NOTE — H&P ADULT - ASSESSMENT
Morbidly obese 58 year old man with PMH HTN presents to ED with complaint of several episodes of lightheadedness and 1 episode of Syncope earlier today.  Patient will require admission for at least 2 midnights as detailed below:    IMPROVE VTE Individual Risk Assessment          RISK                                                          Points    [  ] Previous VTE                                                3    [  ] Thrombophilia                                             2    [  ] Lower limb paralysis                                    2        (unable to hold up >15 seconds)      [  ] Current Cancer                                             2         (within 6 months)    [x  ] Immobilization > 24 hrs                              1    [  ] ICU/CCU stay > 24 hours                            1    [  ] Age > 60                                                    1    IMPROVE VTE Score _____1____

## 2019-05-28 NOTE — ED PROVIDER NOTE - MUSCULOSKELETAL, MLM
Spine appears normal, range of motion is not limited, no muscle or joint tenderness Spine appears normal, range of motion is not limited, no muscle or joint tenderness +bilateral edema

## 2019-05-28 NOTE — ED ADULT NURSE NOTE - ED STAT RN HANDOFF DETAILS
Report received from RN at 7pm. Assessment available on KB. will continue to monitor. on zoll and cardiac monitor at bedside. family at bedside.

## 2019-05-28 NOTE — ED ADULT TRIAGE NOTE - SOURCE OF INFORMATION
"Patient Education     Bladder Infection,Â Female (Adult)    Urine is normally doesn't have any bacteria in it. But bacteria can get into the urinary tract from the skin around the rectum. Or they can travel in the blood from elsewhere in the body. Once they are in your urinary tract, they can cause infection in the urethra (urethritis), the bladder (cystitis), or the kidneys (pyelonephritis). The most common place for an infection is in the bladder. This is called a bladder infection. This is one of the most common infections in women. Most bladder infections are easily treated. They are not serious unless the infection spreads to the kidney. The phrases ""bladder infection,\"" \""UTI,\"" and \""cystitis\"" are often used to describe the same thing. But they are not always the same. Cystitis is an inflammation of the bladder. TheÂ most common cause of cystitis is an infection. Symptoms  The infection causes inflammation in the urethra and bladder. This causes many of the symptoms. The most common symptoms of a bladder infection are:  Â· Pain or burning when urinating  Â· Having to urinate more often than usual  Â· Urgent need to urinate  Â· Only a small amount of urine comes out  Â· Blood in urine  Â· Abdominal discomfort. This is usually in the lower abdomen above the pubic bone. Â· Cloudy urine  Â· Strong- or bad-smelling urine  Â· Unable to urinate (urinary retention)  Â· Unable to hold urine in (urinary incontinence)  Â· Fever  Â· Loss of appetite  Â· Confusion (in older adults)  Causes  Bladder infections are not contagious. You can't get one from someone else, from a toilet seat, or from sharing a bath. The most common cause of bladder infections is bacteria from the bowels. The bacteria get onto the skin around the opening of the urethra. From there, they can get into the urine and travel up to the bladder, causing inflammation and infection. This usually happens because of:  Â· Wiping improperly after urinating.  Always wipe from " front to back. Â· Bowel incontinence  Â· Pregnancy  Â· Procedures such as having a catheter inserted  Â· Older age  Â· Not emptying your bladder. This can allow bacteria a chance to grow in your urine. Â· Dehydration  Â· Constipation  Â· Sex  Â· Use of a diaphragm for birth controlÂ   Treatment  Bladder infections are diagnosed by a urine test. They are treated with antibiotics and usuallyÂ clear up quickly without complications. Treatment helps prevent a more serious kidney infection. Medicines  Medicines can help in the treatment of a bladder infection:  Â· Take antibiotics until they are used up, even if you feel better. It is important to finish them to make sure the infection has cleared. Â· You can use acetaminophen or ibuprofen for pain, fever, or discomfort, unless another medicine was prescribed. If you have chronic liver or kidney disease, talk with your healthcareÂ provider before usingÂ these medicines. Also talk with your provider if you've ever had a stomach ulcer or gastrointestinal bleeding, or are taking blood-thinner medicines. Â· If you are givenÂ phenazopydridine to reduce burning with urination, it will cause your urine to become a bright orange color. This can stain clothing. Care and prevention  These self-care steps can help prevent future infections:  Â· Drink plenty of fluids to prevent dehydration and flush out your bladder. Do thisÂ unless you must restrict fluids for other health reasons, or your doctor told you not to. Â· Proper cleaning after going to the bathroom is important. Wipe from front to back after using the toilet to prevent the spread of bacteria. Â· Urinate more often. Don't try to hold urine in for a long time. Â· Wear loose-fitting clothes and cotton underwear. Avoid tight-fitting pants. Â· Improve your diet and prevent constipation. Eat more fresh fruit and vegetables, andÂ fiber, and less junk and fatty foods. Â· Avoid sex until your symptoms are gone.   Â· Avoid caffeine, alcohol, and spicy foods. These can irritate your bladder. Â· Urinate right after intercourse to flush out your bladder. Â· If you use birth control pills and have frequent bladder infections, discuss it with your doctor. Follow-up care  Call your healthcare provider if all symptoms are not gone after 3 days of treatment. This is especially important if you have repeat infections. If a culture was done, you will be told if your treatment needs to be changed. If directed, you can callÂ to find out the results. If X-rays were done, you will be told if the results will affect yourÂ treatment. Call 911  Call 911 if any of the following occur:  Â· Trouble breathing  Â· Hard to wake up orÂ confusion  Â· Fainting or loss of consciousness  Â· Rapid heart rate  When to seek medical advice  Call your healthcare provider right away if any of these occur:  Â· Fever of 100.4ÂºF (38.0ÂºC) or higher, or as directed by your healthcare provider  Â· Symptoms are not betterÂ by the third day of treatment  Â· Back or belly (abdominal) pain that gets worse  Â· Repeated vomiting, or unable to keep medicine down  Â· Weakness or dizziness  Â· Vaginal discharge  Â· Pain, redness, or swelling in the outer vaginal area (labia)  Date Last Reviewed: 10/1/2016  Â© 9304-6964 [facility]. [address] [place] [address]. All rights reserved. This information is not intended as a substitute for professional medical care. Always follow your healthcare professional's instructions. EMS/Patient

## 2019-05-28 NOTE — ED ADULT NURSE NOTE - OBJECTIVE STATEMENT
patient complains of dizziness and diaphoresis this morning, patient c/o of dizziness started this morning with N/V after going to have a BM , denied chest pain denied headache denied back pain denied abdominal pain , denied difficulty breathing, patient moving all extremities, no facial droop clear speech, patient shows second degree block type 2

## 2019-05-28 NOTE — H&P ADULT - HISTORY OF PRESENT ILLNESS
Pt admitted for cardiac work up, in progress. Morbidly obese 58 year old man with PMH HTN presents to ED with complaint of several episodes of lightheadedness and 1 episode of Syncope earlier today.  He states that he has a history of Heart block and was being considered for PPM.  He denies chest pain, palpitations, SOB, weakness, numbness, slurred speech, facial drooping, blurred vision.  Pt also thinks he has sleep apnea as he is very tired constantly and wakes up frequently at night.  He is SOB here and is using BiPAP intermittently.    In the ED, pt's EKG shows Mobitz 1, cardiac enzymes normal, CXR read as clear lungs.

## 2019-05-28 NOTE — H&P ADULT - NSHPLABSRESULTS_GEN_ALL_CORE
Vital Signs Last 24 Hrs  T(C): 36.5 (28 May 2019 20:20), Max: 36.6 (28 May 2019 16:00)  T(F): 97.7 (28 May 2019 20:20), Max: 97.9 (28 May 2019 16:00)  HR: 44 (28 May 2019 22:04) (44 - 57)  BP: 162/103 (28 May 2019 20:20) (140/80 - 162/103)  BP(mean): --  RR: 16 (28 May 2019 20:20) (16 - 19)  SpO2: 94% (28 May 2019 22:04) (94% - 100%)          LABS:                        12.8   9.33  )-----------( 137      ( 28 May 2019 18:09 )             41.4     05-28    140  |  106  |  16  ----------------------------<  130<H>  4.5   |  28  |  1.14    Ca    8.6      28 May 2019 20:44    TPro  7.3  /  Alb  2.8<L>  /  TBili  0.4  /  DBili  x   /  AST  18  /  ALT  20  /  AlkPhos  77  05-28    PT/INR - ( 28 May 2019 18:09 )   PT: 12.9 sec;   INR: 1.15 ratio         PTT - ( 28 May 2019 18:09 )  PTT:19.4 sec      RADIOLOGY & ADDITIONAL STUDIES:    CXR:  Clear lungs

## 2019-05-28 NOTE — ED ADULT TRIAGE NOTE - CHIEF COMPLAINT QUOTE
patient c/o of dizziness started this morning with N/V , denied chest pain denied headache denied back pain denied abdominal pain , denied difficulty breathing, patient moving all extremities, no facial droop clear speech

## 2019-05-28 NOTE — ED ADULT NURSE NOTE - ED STAT RN HANDOFF DETAILS 2
Report endorsed to hold RN. Safety checks compld this shift/Safety rounds completed hourly.  IV sites checked Q2+remains WDL. Fall +skin precs in place. Any issues endorsed to oncoming RN for follow up.

## 2019-05-28 NOTE — ED PROVIDER NOTE - CONSTITUTIONAL, MLM
normal... Obese makel awake, alert, oriented to person, place, time/situation, pt is awake but sleepy, denies h/o sleep apnea but his wife states that he was told he needs testing for it

## 2019-05-28 NOTE — H&P ADULT - PROBLEM SELECTOR PLAN 3
Given history and body habitus, likely HAYLEE  Pt will need work up as outpatient  Pulmonary consult  Will send BNP

## 2019-05-28 NOTE — ED PROVIDER NOTE - PROGRESS NOTE DETAILS
pt's pmd Dr Cabello called, he did have an appointment in the office today, requested pt's previous cardiac workup be faxed over dr zheng pt was seen by dr velasquez in the past, has h/o av heart block, dr zheng on call, case discussed, will be seen tomorrow, recommnends abg to rule out retained co2, pt was told to have sleep apnea studies but has not done so yet

## 2019-05-28 NOTE — H&P ADULT - NSHPPHYSICALEXAM_GEN_ALL_CORE
GENERAL: NAD, well-groomed, well-developed, obese  HEAD:  Atraumatic, Normocephalic  EYES: EOMI, PERRLA, conjunctiva and sclera clear  ENMT: No tonsillar erythema, exudates, or enlargement; Moist mucous membranes, No lesions  NECK: Supple, No JVD, Normal thyroid  NERVOUS SYSTEM:  Alert & Oriented X3, Good concentration, CN 2-12 intact, strength 5/5  CHEST/LUNG: Clear to ascultation bilaterally; No rales, rhonchi, wheezing, or rubs  HEART: Irregular rate; No murmurs, rubs, or gallops  ABDOMEN: Soft, Nontender, Nondistended; Bowel sounds present  EXTREMITIES: 1+ non-pitting edema b/l LE  SKIN: no rashes or lesions  PSYCH: normal affect and behavior

## 2019-05-28 NOTE — ED PROVIDER NOTE - OBJECTIVE STATEMENT
58 year old male presents today biba accompanied with his wife c/o feeling dizziness since waking up this morning, pt woke up at 8am c/o dizziness and unsteadiness, went to the bathroom assisted by his wife thinking he needed to use the bathroom, his symptoms did resolved but returned again at 1pm and again at 3pm, pt went to get up and became diaphoretic and feels he passed out +chest pressure (-) sob +vomiting x 1 (-) jaw or arm pain (-) recent travel +cold symptoms last week which resolved

## 2019-05-29 DIAGNOSIS — E11.9 TYPE 2 DIABETES MELLITUS WITHOUT COMPLICATIONS: ICD-10-CM

## 2019-05-29 LAB
ANION GAP SERPL CALC-SCNC: 7 MMOL/L — SIGNIFICANT CHANGE UP (ref 5–17)
BASOPHILS # BLD AUTO: 0.03 K/UL — SIGNIFICANT CHANGE UP (ref 0–0.2)
BASOPHILS NFR BLD AUTO: 0.4 % — SIGNIFICANT CHANGE UP (ref 0–2)
BUN SERPL-MCNC: 15 MG/DL — SIGNIFICANT CHANGE UP (ref 7–23)
CALCIUM SERPL-MCNC: 8.3 MG/DL — LOW (ref 8.5–10.1)
CHLORIDE SERPL-SCNC: 104 MMOL/L — SIGNIFICANT CHANGE UP (ref 96–108)
CHOLEST SERPL-MCNC: 213 MG/DL — HIGH (ref 10–199)
CK MB BLD-MCNC: 1 % — SIGNIFICANT CHANGE UP (ref 0–3.5)
CK MB BLD-MCNC: 1.4 % — SIGNIFICANT CHANGE UP (ref 0–3.5)
CK MB CFR SERPL CALC: 1 NG/ML — SIGNIFICANT CHANGE UP (ref 0.5–3.6)
CK MB CFR SERPL CALC: 1.4 NG/ML — SIGNIFICANT CHANGE UP (ref 0.5–3.6)
CK SERPL-CCNC: 100 U/L — SIGNIFICANT CHANGE UP (ref 26–308)
CK SERPL-CCNC: 105 U/L — SIGNIFICANT CHANGE UP (ref 26–308)
CO2 SERPL-SCNC: 29 MMOL/L — SIGNIFICANT CHANGE UP (ref 22–31)
CREAT SERPL-MCNC: 1 MG/DL — SIGNIFICANT CHANGE UP (ref 0.5–1.3)
EOSINOPHIL # BLD AUTO: 0.2 K/UL — SIGNIFICANT CHANGE UP (ref 0–0.5)
EOSINOPHIL NFR BLD AUTO: 2.5 % — SIGNIFICANT CHANGE UP (ref 0–6)
GLUCOSE SERPL-MCNC: 104 MG/DL — HIGH (ref 70–99)
HBA1C BLD-MCNC: 7 % — HIGH (ref 4–5.6)
HCT VFR BLD CALC: 45.9 % — SIGNIFICANT CHANGE UP (ref 39–50)
HCV AB S/CO SERPL IA: 0.19 S/CO — SIGNIFICANT CHANGE UP (ref 0–0.99)
HCV AB SERPL-IMP: SIGNIFICANT CHANGE UP
HDLC SERPL-MCNC: 59 MG/DL — SIGNIFICANT CHANGE UP
HGB BLD-MCNC: 14.1 G/DL — SIGNIFICANT CHANGE UP (ref 13–17)
IMM GRANULOCYTES NFR BLD AUTO: 0.2 % — SIGNIFICANT CHANGE UP (ref 0–1.5)
LIPID PNL WITH DIRECT LDL SERPL: 138 MG/DL — HIGH
LYMPHOCYTES # BLD AUTO: 1.63 K/UL — SIGNIFICANT CHANGE UP (ref 1–3.3)
LYMPHOCYTES # BLD AUTO: 20.1 % — SIGNIFICANT CHANGE UP (ref 13–44)
MCHC RBC-ENTMCNC: 27.9 PG — SIGNIFICANT CHANGE UP (ref 27–34)
MCHC RBC-ENTMCNC: 30.7 GM/DL — LOW (ref 32–36)
MCV RBC AUTO: 90.7 FL — SIGNIFICANT CHANGE UP (ref 80–100)
MONOCYTES # BLD AUTO: 0.59 K/UL — SIGNIFICANT CHANGE UP (ref 0–0.9)
MONOCYTES NFR BLD AUTO: 7.3 % — SIGNIFICANT CHANGE UP (ref 2–14)
NEUTROPHILS # BLD AUTO: 5.64 K/UL — SIGNIFICANT CHANGE UP (ref 1.8–7.4)
NEUTROPHILS NFR BLD AUTO: 69.5 % — SIGNIFICANT CHANGE UP (ref 43–77)
NRBC # BLD: 0 /100 WBCS — SIGNIFICANT CHANGE UP (ref 0–0)
NT-PROBNP SERPL-SCNC: 199 PG/ML — HIGH (ref 0–125)
PLATELET # BLD AUTO: 147 K/UL — LOW (ref 150–400)
POTASSIUM SERPL-MCNC: 4.3 MMOL/L — SIGNIFICANT CHANGE UP (ref 3.5–5.3)
POTASSIUM SERPL-SCNC: 4.3 MMOL/L — SIGNIFICANT CHANGE UP (ref 3.5–5.3)
RBC # BLD: 5.06 M/UL — SIGNIFICANT CHANGE UP (ref 4.2–5.8)
RBC # FLD: 15.1 % — HIGH (ref 10.3–14.5)
SODIUM SERPL-SCNC: 140 MMOL/L — SIGNIFICANT CHANGE UP (ref 135–145)
TOTAL CHOLESTEROL/HDL RATIO MEASUREMENT: 3.6 RATIO — SIGNIFICANT CHANGE UP (ref 3.4–9.6)
TRIGL SERPL-MCNC: 80 MG/DL — SIGNIFICANT CHANGE UP (ref 10–149)
TROPONIN I SERPL-MCNC: <.015 NG/ML — SIGNIFICANT CHANGE UP (ref 0.01–0.04)
TROPONIN I SERPL-MCNC: <.015 NG/ML — SIGNIFICANT CHANGE UP (ref 0.01–0.04)
WBC # BLD: 8.11 K/UL — SIGNIFICANT CHANGE UP (ref 3.8–10.5)
WBC # FLD AUTO: 8.11 K/UL — SIGNIFICANT CHANGE UP (ref 3.8–10.5)

## 2019-05-29 PROCEDURE — 99233 SBSQ HOSP IP/OBS HIGH 50: CPT

## 2019-05-29 PROCEDURE — 99223 1ST HOSP IP/OBS HIGH 75: CPT

## 2019-05-29 RX ORDER — PANTOPRAZOLE SODIUM 20 MG/1
40 TABLET, DELAYED RELEASE ORAL
Refills: 0 | Status: DISCONTINUED | OUTPATIENT
Start: 2019-05-29 | End: 2019-05-31

## 2019-05-29 RX ADMIN — LISINOPRIL 10 MILLIGRAM(S): 2.5 TABLET ORAL at 05:16

## 2019-05-29 RX ADMIN — Medication 25 MILLIGRAM(S): at 05:16

## 2019-05-29 RX ADMIN — HEPARIN SODIUM 5000 UNIT(S): 5000 INJECTION INTRAVENOUS; SUBCUTANEOUS at 21:06

## 2019-05-29 RX ADMIN — HEPARIN SODIUM 5000 UNIT(S): 5000 INJECTION INTRAVENOUS; SUBCUTANEOUS at 13:55

## 2019-05-29 RX ADMIN — HEPARIN SODIUM 5000 UNIT(S): 5000 INJECTION INTRAVENOUS; SUBCUTANEOUS at 05:16

## 2019-05-29 RX ADMIN — Medication 40 MILLIGRAM(S): at 05:16

## 2019-05-29 RX ADMIN — Medication 30 MILLILITER(S): at 14:00

## 2019-05-29 RX ADMIN — PANTOPRAZOLE SODIUM 40 MILLIGRAM(S): 20 TABLET, DELAYED RELEASE ORAL at 17:36

## 2019-05-29 NOTE — CONSULT NOTE ADULT - ASSESSMENT
58 year old man with PMH HTN, HL, s/p gastric bypass with persistent morbid obesity and LE edema; presents to ED with complaint of several episodes of lightheadedness and 1 episode of Syncope earlier today.  He states that he has a history of Heart block and was being considered for PPM... seen in the office with concern for high-grade AV block with junctional escape rhythm; was supposed to f/u at [facility] but never did.  He denies chest pain, palpitations, SOB, weakness, numbness, slurred speech, facial drooping, blurred vision.  Pt also thinks he has sleep apnea as he is very tired constantly and wakes up frequently at night.  He is SOB here and is using BiPAP intermittently.    In the ED, pt's EKG shows Mobitz 1  Kassy neg  Tele: Livan; shannan 30-40s   Echo 10/2018: unremarkable    -monitor on tele  -avoid all AV rudy blockers  -discussed PPM with family; currently refusing PPM  -will cont to monitor rhythm for HB  -pulm eval pending for HAYLEE  -2D echo

## 2019-05-29 NOTE — PROGRESS NOTE ADULT - SUBJECTIVE AND OBJECTIVE BOX
Patient is a 58y old  Male who presents with a chief complaint of lightheadedness, Syncope (28 May 2019 22:26)      INTERVAL HPI/OVERNIGHT EVENTS: no events , shannan overnight on tele     MEDICATIONS  (STANDING):  furosemide    Tablet 40 milliGRAM(s) Oral daily  heparin  Injectable 5000 Unit(s) SubCutaneous every 8 hours  hydrochlorothiazide 25 milliGRAM(s) Oral daily  lisinopril 10 milliGRAM(s) Oral daily  pantoprazole    Tablet 40 milliGRAM(s) Oral before breakfast    MEDICATIONS  (PRN):  aluminum hydroxide/magnesium hydroxide/simethicone Suspension 30 milliLiter(s) Oral every 4 hours PRN Dyspepsia      Allergies    No Known Allergies    Intolerances           Vital Signs Last 24 Hrs  T(C): 35.9 (29 May 2019 10:23), Max: 36.6 (28 May 2019 16:00)  T(F): 96.7 (29 May 2019 10:23), Max: 97.9 (28 May 2019 16:00)  HR: 68 (29 May 2019 10:23) (44 - 85)  BP: 126/72 (29 May 2019 10:23) (126/72 - 190/91)  BP(mean): --  RR: 18 (29 May 2019 10:23) (16 - 19)  SpO2: 94% (29 May 2019 10:23) (91% - 100%)    PHYSICAL EXAM:  GENERAL: NAD, well-groomed, well-developed, obese  	HEAD:  Atraumatic, Normocephalic  	EYES: EOMI, PERRLA, conjunctiva and sclera clear  	ENMT: No tonsillar erythema, exudates, or enlargement; Moist mucous membranes, No lesions  	NECK: Supple, No JVD, Normal thyroid  	NERVOUS SYSTEM:  Alert & Oriented X3, Good concentration, CN 2-12 intact, strength 5/5  	CHEST/LUNG: Clear to ascultation bilaterally; No rales, rhonchi, wheezing, or rubs  	HEART: Irregular rate; No murmurs, rubs, or gallops  	ABDOMEN: Soft, Nontender, Nondistended; Bowel sounds present  	EXTREMITIES: 1+ non-pitting edema b/l LE  	SKIN: no rashes or lesions  PSYCH: normal affect and behavior    LABS:                        14.1   8.11  )-----------( 147      ( 29 May 2019 07:47 )             45.9     05-29    140  |  104  |  15  ----------------------------<  104<H>  4.3   |  29  |  1.00    Ca    8.3<L>      29 May 2019 07:47    TPro  7.3  /  Alb  2.8<L>  /  TBili  0.4  /  DBili  x   /  AST  18  /  ALT  20  /  AlkPhos  77  05-28    PT/INR - ( 28 May 2019 18:09 )   PT: 12.9 sec;   INR: 1.15 ratio         PTT - ( 28 May 2019 18:09 )  PTT:19.4 sec    CAPILLARY BLOOD GLUCOSE      POCT Blood Glucose.: 110 mg/dL (28 May 2019 16:00)      RADIOLOGY & ADDITIONAL TESTS:    Imaging Personally Reviewed:  [ X] YES  [ ] NO    Consultant(s) Notes Reviewed:  [ X] YES  [ ] NO    Care Discussed with Consultants/Other Providers [X ] YES  [ ] NO

## 2019-05-29 NOTE — CONSULT NOTE ADULT - SUBJECTIVE AND OBJECTIVE BOX
CARDIOLOGY CONSULT NOTE    Patient is a 58y Male with a known history of :  Type 2 diabetes mellitus without complication, without long-term current use of insulin (E11.9)  Preventive measure (Z29.9)  Morbid obesity due to excess calories (E66.01)  Essential hypertension (I10)  Shortness of breath (R06.02)  Mobitz I (I44.1)  Syncope and collapse (R55)    HPI:  58 year old man with PMH HTN, HL, s/p gastric bypass with persistent morbid obesity and LE edema; presents to ED with complaint of several episodes of lightheadedness and 1 episode of Syncope earlier today.  He states that he has a history of Heart block and was being considered for PPM... seen in the office with concern for high-grade AV block with junctional escape rhythm; was supposed to f/u at [facility] but never did.  He denies chest pain, palpitations, SOB, weakness, numbness, slurred speech, facial drooping, blurred vision.  Pt also thinks he has sleep apnea as he is very tired constantly and wakes up frequently at night.  He is SOB here and is using BiPAP intermittently.    In the ED, pt's EKG shows Mobitz 1  Kassy neg  Tele: Livan; shannan 30-40s   Echo 10/2018: unremarkable      REVIEW OF SYSTEMS:    CONSTITUTIONAL: No fever, weight loss, or fatigue  EYES: No eye pain, visual disturbances, or discharge  ENMT:  No difficulty hearing, tinnitus, vertigo; No sinus or throat pain  NECK: No pain or stiffness  BREASTS: No pain, masses, or nipple discharge  RESPIRATORY: No cough, wheezing, chills or hemoptysis; No shortness of breath  CARDIOVASCULAR: No chest pain, palpitations, dizziness, or leg swelling  GASTROINTESTINAL: No abdominal or epigastric pain. No nausea, vomiting, or hematemesis; No diarrhea or constipation. No melena or hematochezia.  GENITOURINARY: No dysuria, frequency, hematuria, or incontinence  NEUROLOGICAL: No headaches, memory loss, loss of strength, numbness, or tremors  SKIN: No itching, burning, rashes, or lesions   LYMPH NODES: No enlarged glands  ENDOCRINE: No heat or cold intolerance; No hair loss  MUSCULOSKELETAL: No joint pain or swelling; No muscle, back, or extremity pain  PSYCHIATRIC: No depression, anxiety, mood swings, or difficulty sleeping  HEME/LYMPH: No easy bruising, or bleeding gums  ALLERGY AND IMMUNOLOGIC: No hives or eczema    MEDICATIONS  (STANDING):  furosemide    Tablet 40 milliGRAM(s) Oral daily  heparin  Injectable 5000 Unit(s) SubCutaneous every 8 hours  hydrochlorothiazide 25 milliGRAM(s) Oral daily  lisinopril 10 milliGRAM(s) Oral daily  pantoprazole    Tablet 40 milliGRAM(s) Oral before breakfast    MEDICATIONS  (PRN):  aluminum hydroxide/magnesium hydroxide/simethicone Suspension 30 milliLiter(s) Oral every 4 hours PRN Dyspepsia      ALLERGIES: No Known Allergies      FAMILY HISTORY:  No pertinent family history in first degree relatives      PHYSICAL EXAMINATION:  -----------------------------  T(C): 36.2 (05-29-19 @ 16:21), Max: 36.5 (05-28-19 @ 20:20)  HR: 56 (05-29-19 @ 16:21) (44 - 85)  BP: 152/91 (05-29-19 @ 16:21) (126/72 - 190/91)  RR: 18 (05-29-19 @ 16:21) (16 - 18)  SpO2: 93% (05-29-19 @ 16:21) (91% - 97%)  Wt(kg): --    05-28 @ 07:01  -  05-29 @ 07:00  --------------------------------------------------------  IN:  Total IN: 0 mL    OUT:    Voided: 800 mL  Total OUT: 800 mL    Total NET: -800 mL      05-29 @ 07:01  -  05-29 @ 17:02  --------------------------------------------------------  IN:    Oral Fluid: 480 mL  Total IN: 480 mL    OUT:    Voided: 3 mL  Total OUT: 3 mL    Total NET: 477 mL        Height (cm): 172.72 (05-29 @ 00:06)  Weight (kg): 161.9 (05-29 @ 00:06)  BMI (kg/m2): 54.3 (05-29 @ 00:06)  BSA (m2): 2.61 (05-29 @ 00:06)    Constitutional: well developed, normal appearance, well groomed, well nourished, no deformities and no acute distress.   Eyes: the conjunctiva exhibited no abnormalities and the eyelids demonstrated no xanthelasmas.   HEENT: normal oral mucosa, no oral pallor and no oral cyanosis.   Neck: normal jugular venous A waves present, normal jugular venous V waves present and no jugular venous bethea A waves.   Pulmonary: no respiratory distress, normal respiratory rhythm and effort, no accessory muscle use and lungs were clear to auscultation bilaterally.   Cardiovascular: heart rate and rhythm were normal, normal S1 and S2 and no murmur, gallop, rub, heave or thrill are present.   Abdomen: soft, non-tender, no hepato-splenomegaly and no abdominal mass palpated.   Musculoskeletal: the gait could not be assessed..   Extremities: +2+ LE edema  Skin: normal skin color and pigmentation, no rash, no venous stasis, no skin lesions, no skin ulcer and no xanthoma was observed.   Psychiatric: oriented to person, place, and time, the affect was normal, the mood was normal and not feeling anxious.     LABS:   --------  05-29    140  |  104  |  15  ----------------------------<  104<H>  4.3   |  29  |  1.00    Ca    8.3<L>      29 May 2019 07:47    TPro  7.3  /  Alb  2.8<L>  /  TBili  0.4  /  DBili  x   /  AST  18  /  ALT  20  /  AlkPhos  77  05-28                         14.1   8.11  )-----------( 147      ( 29 May 2019 07:47 )             45.9     PT/INR - ( 28 May 2019 18:09 )   PT: 12.9 sec;   INR: 1.15 ratio         PTT - ( 28 May 2019 18:09 )  PTT:19.4 sec  05-29 @ 00:17 BNP: 199 pg/mL    05-29 @ 07:47 CPK total:--, CKMB --, Troponin I - <.015 ng/mL  05-29 @ 00:17 CPK total:--, CKMB --, Troponin I - <.015 ng/mL  05-28 @ 18:09 CPK total:--, CKMB --, Troponin I - <.015 ng/mL    213 mg/dL<H>, 138 mg/dL<H>, 59 mg/dL, 80 mg/dL        RADIOLOGY:  -----------------    Echo 10/2018: unremarkable
Patient is a 58y old  Male who presents with a chief complaint of lightheadedness, Syncope (29 May 2019 12:48)    HPI:  58 year old man with HTN, Morbid Obesity S/P Gastric bypass,  DM and heart blocks  Presented  to ED with complaint of several episodes of lightheadedness and 1 episode of Syncope earlier today.  He states that he has a history of Heart block and was being considered for PPM.  He denies chest pain but reports being uncomfortable in chest, had profuse sweating at time of dizziness.  Reports poor nocturnal sleep, apnea episodes noted by wife and has day time tiredness.  No tobacco abuse but uses alcohol on regular basis.    PAST MEDICAL & SURGICAL HISTORY:  Diabetes  AV heart block  Obesity  Hypertension  H/O gastric bypass    FAMILY HISTORY:  No pertinent family history in first degree relatives    SOCIAL HISTORY: BMI (kg/m2): 54.3 . no smoking    Allergies  No Known Allergies    MEDICATIONS  (STANDING):  furosemide    Tablet 40 milliGRAM(s) Oral daily  heparin  Injectable 5000 Unit(s) SubCutaneous every 8 hours  hydrochlorothiazide 25 milliGRAM(s) Oral daily  lisinopril 10 milliGRAM(s) Oral daily  pantoprazole    Tablet 40 milliGRAM(s) Oral before breakfast    MEDICATIONS  (PRN):  aluminum hydroxide/magnesium hydroxide/simethicone Suspension 30 milliLiter(s) Oral every 4 hours PRN Dyspepsia    REVIEW OF SYSTEMS:    Constitutional:            No fever, weight loss, +ve fatigue, sweating  HEENT:                        No difficulty hearing, tinnitus, vertigo; No sinus or throat pain  Respiratory:               sob  Cardiovascular:           chest heaviness,   Gastrointestinal:        No abdominal or epigastric pain. No N/V/diarrhea or hematemesis  Genitourinary:            No dysuria, frequency, hematuria or incontinence  SKIN:                          no rash  Musculoskeletal:        No joint pain or swelling  Extremities:                No swelling  Neurological:             dizziness  Psychiatric:                 No depression, anxiety    Vital Signs Last 24 Hrs  T(C): 35.9 (29 May 2019 10:23), Max: 36.5 (28 May 2019 20:20)  T(F): 96.7 (29 May 2019 10:23), Max: 97.7 (28 May 2019 20:20)  HR: 60 (29 May 2019 13:17) (44 - 85)  BP: 126/72 (29 May 2019 10:23) (126/72 - 190/91)  BP(mean): --  RR: 18 (29 May 2019 10:23) (16 - 18)  SpO2: 95% (29 May 2019 13:17) (91% - 97%)    PHYSICAL EXAM:  GEN:         Awake, responsive and comfortable.  HEENT:    Normal.    RESP:        no wheezing.  CVS:           Regular rate and rhythm.   ABD:         Soft, non-tender, non-distended; obese  :             No costovertebral angle tenderness  SKIN:           Warm and dry.  EXTR:            No clubbing, cyanosis or edema  CNS:              Intact sensory and motor function.  PSYCH:        cooperative, no anxiety or depression    LABS:                        14.1   8.11  )-----------( 147      ( 29 May 2019 07:47 )             45.9     05-29    140  |  104  |  15  ----------------------------<  104<H>  4.3   |  29  |  1.00    Ca    8.3<L>      29 May 2019 07:47    TPro  7.3  /  Alb  2.8<L>  /  TBili  0.4  /  DBili  x   /  AST  18  /  ALT  20  /  AlkPhos  77  05-28    PT/INR - ( 28 May 2019 18:09 )   PT: 12.9 sec;   INR: 1.15 ratio      PTT - ( 28 May 2019 18:09 )  PTT:19.4 sec  05-28 @ 18:26  pH: 7.36  pCO2: 55  pO2: 58  SaO2: 87    EKG: sinus rhythm, 2nd degree AV block.    RADIOLOGY & ADDITIONAL STUDIES:  < from: Xray Chest 1 View- PORTABLE-Urgent (05.28.19 @ 17:24) >    EXAM:  XR CHEST PORTABLE URGENT 1V                          PROCEDURE DATE:  05/28/2019      INTERPRETATION:  CLINICAL STATEMENT: Chest Pain.  Check for lung infiltration area    TECHNIQUE: AP view of the chest.    COMPARISON: None.     The cardiomediastinal silhouette is enlarged but the ananya are not   enlarged. The trachea is midline. There is no focal infiltrate or pleural   effusion. The osseous structures are intact.    IMPRESSION:    Cardiomegaly.  No parenchymal infiltration.    ALMA MURRAY M.D., ATTENDING RADIOLOGIST  This document has been electronically signed. May 28 2019  5:41PM      ASSESSMENT AND PLAN:  ·	Syncope.  ·	AV block.  ·	Chronic hypoxia hypercarbia,  ·	Morbid Obesity.  ·	Suspect HAYLEE.  ·	HTN.  ·	DM.    Will try on nocturnal BIPAP and observe heart rhythm,  Will need sleep study as out pt.  Cardiology evaluation.

## 2019-05-29 NOTE — CHART NOTE - NSCHARTNOTEFT_GEN_A_CORE
Requested to have Definity Echo performed. Met with patient, explained procedure to patient including benefits and risks. Definity administered via LPIV  as per guidelines.  Patient tolerated the procedure well.   Post procedure Vital signs:   BP:    152/91    HR:   56  RR:  18   O2 Sat: 93%

## 2019-05-29 NOTE — PROGRESS NOTE ADULT - ASSESSMENT
Morbidly obese 58 year old man with PMH HTN presents to ED with complaint of several episodes of lightheadedness and 1 episode of Syncope earlier today.

## 2019-05-30 ENCOUNTER — TRANSCRIPTION ENCOUNTER (OUTPATIENT)
Age: 59
End: 2019-05-30

## 2019-05-30 DIAGNOSIS — I50.32 CHRONIC DIASTOLIC (CONGESTIVE) HEART FAILURE: ICD-10-CM

## 2019-05-30 DIAGNOSIS — I45.9 CONDUCTION DISORDER, UNSPECIFIED: ICD-10-CM

## 2019-05-30 LAB
ANION GAP SERPL CALC-SCNC: 6 MMOL/L — SIGNIFICANT CHANGE UP (ref 5–17)
BUN SERPL-MCNC: 16 MG/DL — SIGNIFICANT CHANGE UP (ref 7–23)
CALCIUM SERPL-MCNC: 9.1 MG/DL — SIGNIFICANT CHANGE UP (ref 8.5–10.1)
CHLORIDE SERPL-SCNC: 97 MMOL/L — SIGNIFICANT CHANGE UP (ref 96–108)
CO2 SERPL-SCNC: 35 MMOL/L — HIGH (ref 22–31)
CREAT SERPL-MCNC: 1.23 MG/DL — SIGNIFICANT CHANGE UP (ref 0.5–1.3)
GLUCOSE SERPL-MCNC: 154 MG/DL — HIGH (ref 70–99)
MAGNESIUM SERPL-MCNC: 2.3 MG/DL — SIGNIFICANT CHANGE UP (ref 1.6–2.6)
PHOSPHATE SERPL-MCNC: 4.1 MG/DL — SIGNIFICANT CHANGE UP (ref 2.5–4.5)
POTASSIUM SERPL-MCNC: 4 MMOL/L — SIGNIFICANT CHANGE UP (ref 3.5–5.3)
POTASSIUM SERPL-SCNC: 4 MMOL/L — SIGNIFICANT CHANGE UP (ref 3.5–5.3)
SODIUM SERPL-SCNC: 138 MMOL/L — SIGNIFICANT CHANGE UP (ref 135–145)
TSH SERPL-MCNC: 1.72 UU/ML — SIGNIFICANT CHANGE UP (ref 0.36–3.74)

## 2019-05-30 PROCEDURE — 93010 ELECTROCARDIOGRAM REPORT: CPT

## 2019-05-30 PROCEDURE — 99233 SBSQ HOSP IP/OBS HIGH 50: CPT

## 2019-05-30 PROCEDURE — 99232 SBSQ HOSP IP/OBS MODERATE 35: CPT

## 2019-05-30 RX ORDER — METFORMIN HYDROCHLORIDE 850 MG/1
1 TABLET ORAL
Qty: 60 | Refills: 0
Start: 2019-05-30 | End: 2019-06-28

## 2019-05-30 RX ADMIN — Medication 25 MILLIGRAM(S): at 06:17

## 2019-05-30 RX ADMIN — Medication 40 MILLIGRAM(S): at 06:17

## 2019-05-30 RX ADMIN — LISINOPRIL 10 MILLIGRAM(S): 2.5 TABLET ORAL at 06:17

## 2019-05-30 RX ADMIN — HEPARIN SODIUM 5000 UNIT(S): 5000 INJECTION INTRAVENOUS; SUBCUTANEOUS at 14:13

## 2019-05-30 RX ADMIN — HEPARIN SODIUM 5000 UNIT(S): 5000 INJECTION INTRAVENOUS; SUBCUTANEOUS at 21:52

## 2019-05-30 RX ADMIN — PANTOPRAZOLE SODIUM 40 MILLIGRAM(S): 20 TABLET, DELAYED RELEASE ORAL at 06:17

## 2019-05-30 RX ADMIN — HEPARIN SODIUM 5000 UNIT(S): 5000 INJECTION INTRAVENOUS; SUBCUTANEOUS at 06:16

## 2019-05-30 RX ADMIN — Medication 30 MILLILITER(S): at 11:54

## 2019-05-30 NOTE — PROGRESS NOTE ADULT - PROBLEM SELECTOR PLAN 4
Continue home medications  DASH diet Likely etiology of Syncope  Keep pacer pads on patient  Telemetry monitoring  Bradycardia overnight

## 2019-05-30 NOTE — DISCHARGE NOTE PROVIDER - HOSPITAL COURSE
Morbidly obese 58 year old man with PMH HTN presents to ED with complaint of several episodes of lightheadedness and 1 episode of Syncope earlier today.              Problem/Plan - 1:    ·  Problem: Syncope and collapse.  Plan: Likely cardiac in etiology    mobitz type 2 on tele, pt refuses ppm, will need holter as outpt         Problem/Plan - 2:    ·  Problem: Mobitz II.  Plan: Likely etiology of Syncope    f/u cardio         Problem/Plan - 3:    ·  Problem: Shortness of breath.  Plan: Given history and body habitus, likely HAYLEE    Pulmonary consult, will need sleep study as outpt         Problem/Plan - 4:    ·  Problem: Essential hypertension.  Plan: Continue home medications    DASH diet.         Problem/Plan - 5:    ·  Problem: Morbid obesity due to excess calories.  Plan: Diet and exercise as tolerated.         Problem/Plan - 6:    Problem: Type 2 diabetes mellitus without complication, without long-term current use of insulin. Plan: start metformin              Attending Attestation:     45 minutes spent on total dc encounter; more than 50% of the visit was spent counseling and/or coordinating care by the attending physician.

## 2019-05-30 NOTE — DISCHARGE NOTE PROVIDER - CARE PROVIDERS DIRECT ADDRESSES
[email or web address]

## 2019-05-30 NOTE — DIETITIAN INITIAL EVALUATION ADULT. - OTHER INFO
Pt seen for nutrition consult and BMI >40. Pt lives with wife. Wife does cooking and shopping. Pt denied any N/V/D/C at this time. N/V 05/29 resolved. Pt without difficulty chewing/swallowing. Discussed with pt a1c 7.0 -average blood glucose=150mg/dL x 3 months; pt not diagnosed with DM advised pt to follow up with out patient primary care doctor. Discussed with pt heart health diet and physical activity to promote safe and gradual weight loss. Pt s/p gastric bipass >1year. Pt reported initial weight loss then regain. Discussed with pt calorie dense foods and encouraged pt to consume foods low in calories and high in nutrition. Provided handout: Cardiac- [facility] Nutrition Therapy.

## 2019-05-30 NOTE — DIETITIAN INITIAL EVALUATION ADULT. - PERTINENT LABORATORY DATA
05-30 Na 138 mmol/L Glu 154 mg/dL<H> K+ 4.0 mmol/L Cr  1.23 mg/dL BUN 16 mg/dL Phos 4.1 mg/dL Alb n/a   PAB n/a   Hgb n/a   Hct n/a

## 2019-05-30 NOTE — DISCHARGE NOTE PROVIDER - PROVIDER TOKENS
PROVIDER:[TOKEN:[[id number]]],PROVIDER:[TOKEN:[[id number]]],PROVIDER:[TOKEN:[[id number]]]

## 2019-05-30 NOTE — PROGRESS NOTE ADULT - SUBJECTIVE AND OBJECTIVE BOX
Patient is a 58y old  Male who presents with a chief complaint of lightheadedness, Syncope (30 May 2019 11:25)      INTERVAL HPI/OVERNIGHT EVENTS:    MEDICATIONS  (STANDING):  furosemide    Tablet 40 milliGRAM(s) Oral daily  heparin  Injectable 5000 Unit(s) SubCutaneous every 8 hours  hydrochlorothiazide 25 milliGRAM(s) Oral daily  lisinopril 10 milliGRAM(s) Oral daily  pantoprazole    Tablet 40 milliGRAM(s) Oral before breakfast    MEDICATIONS  (PRN):  aluminum hydroxide/magnesium hydroxide/simethicone Suspension 30 milliLiter(s) Oral every 4 hours PRN Dyspepsia      Allergies    No Known Allergies    Intolerances        REVIEW OF SYSTEMS:  CONSTITUTIONAL: No fever, weight loss, or fatigue  EYES: No eye pain, visual disturbances, or discharge  ENMT:  No difficulty hearing, tinnitus, vertigo; No sinus or throat pain  NECK: No pain or stiffness  BREASTS: No pain, masses, or nipple discharge  RESPIRATORY: No cough, wheezing, chills or hemoptysis; No shortness of breath  CARDIOVASCULAR: No chest pain, palpitations, dizziness, or leg swelling  GASTROINTESTINAL: No abdominal or epigastric pain. No nausea, vomiting, or hematemesis; No diarrhea or constipation. No melena or hematochezia.  GENITOURINARY: No dysuria, frequency, hematuria, or incontinence  NEUROLOGICAL: No headaches, memory loss, loss of strength, numbness, or tremors  SKIN: No itching, burning, rashes, or lesions   LYMPH NODES: No enlarged glands  ENDOCRINE: No heat or cold intolerance; No hair loss  MUSCULOSKELETAL: No joint pain or swelling; No muscle, back, or extremity pain  PSYCHIATRIC: No depression, anxiety, mood swings, or difficulty sleeping  HEME/LYMPH: No easy bruising, or bleeding gums  ALLERGY AND IMMUNOLOGIC: No hives or eczema    Vital Signs Last 24 Hrs  T(C): 35.8 (30 May 2019 10:47), Max: 36.2 (29 May 2019 16:21)  T(F): 96.4 (30 May 2019 10:47), Max: 97.2 (29 May 2019 16:21)  HR: 56 (30 May 2019 12:31) (48 - 80)  BP: 93/60 (30 May 2019 10:47) (93/60 - 152/91)  BP(mean): --  RR: 18 (30 May 2019 10:47) (18 - 18)  SpO2: 96% (30 May 2019 12:31) (93% - 99%)    PHYSICAL EXAM:  GENERAL: NAD, well-groomed, well-developed  HEAD:  Atraumatic, Normocephalic  EYES: EOMI, PERRLA, conjunctiva and sclera clear  ENMT: No tonsillar erythema, exudates, or enlargement; Moist mucous membranes, Good dentition, No lesions  NECK: Supple, No JVD, Normal thyroid  NERVOUS SYSTEM:  Alert & Oriented X3, Good concentration; Motor Strength 5/5 B/L upper and lower extremities; DTRs 2+ intact and symmetric  CHEST/LUNG: Clear to percussion bilaterally; No rales, rhonchi, wheezing, or rubs  HEART: Regular rate and rhythm; No murmurs, rubs, or gallops  ABDOMEN: Soft, Nontender, Nondistended; Bowel sounds present  EXTREMITIES:  2+ Peripheral Pulses, No clubbing, cyanosis, or edema  LYMPH: No lymphadenopathy noted  SKIN: No rashes or lesions    LABS:                        14.1   8.11  )-----------( 147      ( 29 May 2019 07:47 )             45.9     05-29    140  |  104  |  15  ----------------------------<  104<H>  4.3   |  29  |  1.00    Ca    8.3<L>      29 May 2019 07:47    TPro  7.3  /  Alb  2.8<L>  /  TBili  0.4  /  DBili  x   /  AST  18  /  ALT  20  /  AlkPhos  77  05-28    PT/INR - ( 28 May 2019 18:09 )   PT: 12.9 sec;   INR: 1.15 ratio         PTT - ( 28 May 2019 18:09 )  PTT:19.4 sec    CAPILLARY BLOOD GLUCOSE          RADIOLOGY & ADDITIONAL TESTS:    Imaging Personally Reviewed:  [ X] YES  [ ] NO    Consultant(s) Notes Reviewed:  [ X] YES  [ ] NO    Care Discussed with Consultants/Other Providers [X ] YES  [ ] NO

## 2019-05-30 NOTE — DISCHARGE NOTE PROVIDER - CARE PROVIDER_API CALL
Ciara Darden)  Cardiology; Interventional Cardiology  [address], [place] [id number]  Phone: [phone]  Fax: [phone]  Follow Up Time:     Andry Cabello)  Internal Medicine  [address] [facility] [address]  [place] [address]  Phone: [phone]  Fax: [phone]  Follow Up Time:     Mulu Higginbotham)  Medicine  [address] [facility], [place] [address]  Phone: [phone]  Fax: [phone]  Follow Up Time:

## 2019-05-30 NOTE — CHART NOTE - NSCHARTNOTEFT_GEN_A_CORE
Upon Nutritional Assessment by the Registered Dietitian your patient was determined to meet criteria / has evidence of the following diagnosis/diagnoses:          [ ]  Mild Protein Calorie Malnutrition        [ ]  Moderate Protein Calorie Malnutrition        [ ] Severe Protein Calorie Malnutrition        [ ] Unspecified Protein Calorie Malnutrition        [ ] Underweight / BMI <19        [ x ] Morbid Obesity / BMI > 40      Findings as based on:  •  Comprehensive nutrition assessment and consultation  •  Calorie counts (nutrient intake analysis)  •  Food acceptance and intake status from observations by staff  •  Follow up  •  Patient education  •  Intervention secondary to interdisciplinary rounds  •   concerns      Treatment:    The following diet has been recommended: Continue current diet Rx.      PROVIDER Section:     By signing this assessment you are acknowledging and agree with the diagnosis/diagnoses assigned by the Registered Dietitian    Comments:  Education, physical activity goals, and  nutrition-related material provided

## 2019-05-30 NOTE — PROGRESS NOTE ADULT - SUBJECTIVE AND OBJECTIVE BOX
Patient is a 58y old  Male who presents with a chief complaint of lightheadedness, Syncope (29 May 2019 17:02)    PAST MEDICAL & SURGICAL HISTORY:  Diabetes  AV heart block  Obesity  Hypertension  H/O gastric bypass    INTERVAL HISTORY: Resting in bed, not in any acute distress  	  MEDICATIONS:  MEDICATIONS  (STANDING):  furosemide    Tablet 40 milliGRAM(s) Oral daily  heparin  Injectable 5000 Unit(s) SubCutaneous every 8 hours  hydrochlorothiazide 25 milliGRAM(s) Oral daily  lisinopril 10 milliGRAM(s) Oral daily  pantoprazole    Tablet 40 milliGRAM(s) Oral before breakfast    MEDICATIONS  (PRN):  aluminum hydroxide/magnesium hydroxide/simethicone Suspension 30 milliLiter(s) Oral every 4 hours PRN Dyspepsia    Vitals:  T(F): 96.4 (05-30-19 @ 10:47), Max: 97.2 (05-29-19 @ 16:21)  HR: 70 (05-30-19 @ 10:47) (48 - 80)  BP: 93/60 (05-30-19 @ 10:47) (93/60 - 152/91)  RR: 18 (05-30-19 @ 10:47) (18 - 18)  SpO2: 96% (05-30-19 @ 10:47) (93% - 99%)    05-29 @ 07:01  -  05-30 @ 07:00  --------------------------------------------------------  IN:    Oral Fluid: 840 mL  Total IN: 840 mL    OUT:    Voided: 5 mL  Total OUT: 5 mL    Total NET: 835 mL    Weight (kg): 161.9 (05-29 @ 00:06)  BMI (kg/m2): 54.3 (05-29 @ 00:06)    PHYSICAL EXAM:  Neuro: Awake, responsive  CV: S1 S2 RRR  Lungs: CTABL  GI: Soft, BS +, ND, NT  Extremities: No edema    TELEMETRY: RSR with second degree HB Type I    RADIOLOGY: < from: Xray Chest 1 View- PORTABLE-Urgent (05.28.19 @ 17:24) >  Cardiomegaly.  No parenchymal infiltration.    < end of copied text >      DIAGNOSTIC TESTING:    [ ] Echocardiogram:< from: TTE Echo Doppler w/ Cont (05.29.19 @ 14:50) >   1. Left ventricular ejection fraction, by visual estimation, is 55 to   60%.   2. Normal left ventricular internal cavitysize.   3. Contrast used to enhance LV, EF is 55%.    < end of copied text >  < from: TTE Echo Doppler w/o Cont (05.29.19 @ 10:56) >   1. Left ventricular ejection fraction, by visual estimation, is 55 to   60%.   2. Mildly increased LV wall thickness.   3. Normal left ventricular internal cavity size.   4. Spectral Doppler shows pseudonormal pattern of left ventricular   myocardial filling (Grade II diastolic dysfunction).   5. There is mild left ventricular hypertrophy.   6. Normal right ventricular size and function.   7. The left atrium is normal in size.   8. The right atrium is normal in size.   9. There is no evidence of pericardial effusion.  10. Thickening of the anterior mitral valve leaflet.  11. Trace mitral valve regurgitation.  12. Structurally normal tricuspid valve, with normal leaflet excursion.  13. Normal trileaflet aortic valve with normal opening.    < end of copied text >    LABS:	 	    CARDIAC MARKERS:  Troponin I, Serum: <.015 ng/mL (05-29 @ 07:47)  Troponin I, Serum: <.015 ng/mL (05-29 @ 00:17)  Troponin I, Serum: <.015 ng/mL (05-28 @ 18:09)    29 May 2019 07:47    140    |  104    |  15     ----------------------------<  104    4.3     |  29     |  1.00   28 May 2019 20:44    140    |  106    |  16     ----------------------------<  130    4.5     |  28     |  1.14     Ca    8.3        29 May 2019 07:47    TPro  7.3    /  Alb  2.8    /  TBili  0.4    /  DBili  x      /  AST  18     /  ALT  20     /  AlkPhos  77     28 May 2019 20:44                          14.1   8.11  )-----------( 147      ( 29 May 2019 07:47 )             45.9 ,                       12.8   9.33  )-----------( 137      ( 28 May 2019 18:09 )             41.4 ,                       13.5   8.87  )-----------( 146      ( 28 May 2019 17:32 )             43.4   proBNP: Serum Pro-Brain Natriuretic Peptide: 199 pg/mL (05-29 @ 00:17)    Lipid Profile: 05-29 @ 11:21  HDL/Total Cholesterol: --  HDL Chol:              59 mg/dL  Serum Chol:            213 mg/dL  Direct LDL:            138 mg/dL  Triglycerides:         80 mg/dL    HgA1c: Hemoglobin A1C, Whole Blood: 7.0 % (05-29 @ 11:25)    INR: 1.15 ratio (05-28 @ 18:09) Patient is a 58y old  Male who presents with a chief complaint of lightheadedness, Syncope (29 May 2019 17:02)    PAST MEDICAL & SURGICAL HISTORY:  Diabetes  AV heart block  Obesity  Hypertension  H/O gastric bypass    INTERVAL HISTORY: Resting in bed, not in any acute distress, c/o GI discomfort at times   	  MEDICATIONS:  MEDICATIONS  (STANDING):  furosemide    Tablet 40 milliGRAM(s) Oral daily  heparin  Injectable 5000 Unit(s) SubCutaneous every 8 hours  hydrochlorothiazide 25 milliGRAM(s) Oral daily  lisinopril 10 milliGRAM(s) Oral daily  pantoprazole    Tablet 40 milliGRAM(s) Oral before breakfast    MEDICATIONS  (PRN):  aluminum hydroxide/magnesium hydroxide/simethicone Suspension 30 milliLiter(s) Oral every 4 hours PRN Dyspepsia    Vitals:  T(F): 96.4 (05-30-19 @ 10:47), Max: 97.2 (05-29-19 @ 16:21)  HR: 70 (05-30-19 @ 10:47) (48 - 80)  BP: 93/60 (05-30-19 @ 10:47) (93/60 - 152/91)  RR: 18 (05-30-19 @ 10:47) (18 - 18)  SpO2: 96% (05-30-19 @ 10:47) (93% - 99%)    05-29 @ 07:01  -  05-30 @ 07:00  --------------------------------------------------------  IN:    Oral Fluid: 840 mL  Total IN: 840 mL    OUT:    Voided: 5 mL  Total OUT: 5 mL    Total NET: 835 mL    Weight (kg): 161.9 (05-29 @ 00:06)  BMI (kg/m2): 54.3 (05-29 @ 00:06)    PHYSICAL EXAM:  Neuro: Awake, responsive  CV: S1 S2 RRR  Lungs: CTABL  GI: Soft, BS +, ND, NT  Extremities: +++ LE edema    TELEMETRY: RSR with second degree HB Type I/AV dissociation/first degree     RADIOLOGY: < from: Xray Chest 1 View- PORTABLE-Urgent (05.28.19 @ 17:24) >  Cardiomegaly.  No parenchymal infiltration.    < end of copied text >      DIAGNOSTIC TESTING:    [ ] Echocardiogram:< from: TTE Echo Doppler w/ Cont (05.29.19 @ 14:50) >   1. Left ventricular ejection fraction, by visual estimation, is 55 to   60%.   2. Normal left ventricular internal cavitysize.   3. Contrast used to enhance LV, EF is 55%.    < end of copied text >  < from: TTE Echo Doppler w/o Cont (05.29.19 @ 10:56) >   1. Left ventricular ejection fraction, by visual estimation, is 55 to   60%.   2. Mildly increased LV wall thickness.   3. Normal left ventricular internal cavity size.   4. Spectral Doppler shows pseudonormal pattern of left ventricular   myocardial filling (Grade II diastolic dysfunction).   5. There is mild left ventricular hypertrophy.   6. Normal right ventricular size and function.   7. The left atrium is normal in size.   8. The right atrium is normal in size.   9. There is no evidence of pericardial effusion.  10. Thickening of the anterior mitral valve leaflet.  11. Trace mitral valve regurgitation.  12. Structurally normal tricuspid valve, with normal leaflet excursion.  13. Normal trileaflet aortic valve with normal opening.    < end of copied text >    LABS:	 	    CARDIAC MARKERS:  Troponin I, Serum: <.015 ng/mL (05-29 @ 07:47)  Troponin I, Serum: <.015 ng/mL (05-29 @ 00:17)  Troponin I, Serum: <.015 ng/mL (05-28 @ 18:09)    29 May 2019 07:47    140    |  104    |  15     ----------------------------<  104    4.3     |  29     |  1.00   28 May 2019 20:44    140    |  106    |  16     ----------------------------<  130    4.5     |  28     |  1.14     Ca    8.3        29 May 2019 07:47    TPro  7.3    /  Alb  2.8    /  TBili  0.4    /  DBili  x      /  AST  18     /  ALT  20     /  AlkPhos  77     28 May 2019 20:44                          14.1   8.11  )-----------( 147      ( 29 May 2019 07:47 )             45.9 ,                       12.8   9.33  )-----------( 137      ( 28 May 2019 18:09 )             41.4 ,                       13.5   8.87  )-----------( 146      ( 28 May 2019 17:32 )             43.4   proBNP: Serum Pro-Brain Natriuretic Peptide: 199 pg/mL (05-29 @ 00:17)    Lipid Profile: 05-29 @ 11:21  HDL/Total Cholesterol: --  HDL Chol:              59 mg/dL  Serum Chol:            213 mg/dL  Direct LDL:            138 mg/dL  Triglycerides:         80 mg/dL    HgA1c: Hemoglobin A1C, Whole Blood: 7.0 % (05-29 @ 11:25)    INR: 1.15 ratio (05-28 @ 18:09)

## 2019-05-30 NOTE — DISCHARGE NOTE PROVIDER - NSDCCPCAREPLAN_GEN_ALL_CORE_FT
PRINCIPAL DISCHARGE DIAGNOSIS  Diagnosis: Syncope  Assessment and Plan of Treatment: f/u with cardio and pulmonary      SECONDARY DISCHARGE DIAGNOSES  Diagnosis: Bradycardia  Assessment and Plan of Treatment:

## 2019-05-30 NOTE — PROGRESS NOTE ADULT - ASSESSMENT
58 year old man with PMH HTN, DM, s/p gastric bypass with persistent morbid obesity and LE edema; presents to ED with complaint of several episodes of lightheadedness and 1 episode of Syncope earlier today.  He states that he has a history of Heart block and was being considered for PPM. seen in the office with concern for high-grade AV block with junctional escape rhythm; was supposed to f/u at [facility] but never did.  He denies chest pain, palpitations, SOB, weakness, numbness, slurred speech, facial drooping, blurred vision.  Pt also thinks he has sleep apnea as he is very tired constantly and wakes up frequently at night.  He is SOB here and is using BiPAP intermittently.    Trop neg,   Tele: Livan; shannan 40s -60s  Echo : normal EF, Grade II DD    -avoid all AV rudy blockers  -discussed PPM with family; currently refusing PPM  -will need 30 day event monitoring as outpatient to evaluate for high grade av block  -pulm following, need sleep study as outpatient 58 year old man with PMH HTN, DM, s/p gastric bypass with persistent morbid obesity and LE edema; presents to ED with complaint of several episodes of lightheadedness and 1 episode of Syncope earlier today.  He states that he has a history of Heart block and was being considered for PPM. seen in the office with concern for high-grade AV block with junctional escape rhythm; was supposed to f/u at [facility] but never did.  He denies chest pain, palpitations, SOB, weakness, numbness, slurred speech, facial drooping, blurred vision.  Pt also thinks he has sleep apnea as he is very tired constantly and wakes up frequently at night.      Trop neg,   Tele: Event of AV dissociation/Mobitz I/First degree   Echo : normal EF, Grade II DD    -cont on tele, avoid all AV rudy blockers  -discussed need for PPM with pt family in the setting of third degree HB and was admitted with syncope and lightheadedness; currently agreeing for PPM, will make arrangements for transfer to [facility] with diuretics for LE edema/BP control   -TSH WNL  -pulm following, need sleep study as outpatient 58 year old man with PMH HTN, DM, s/p gastric bypass with persistent morbid obesity and LE edema; presents to ED with complaint of several episodes of lightheadedness and 1 episode of Syncope earlier today.  He states that he has a history of Heart block and was being considered for PPM. seen in the office with concern for high-grade AV block with junctional escape rhythm; was supposed to f/u at [facility] but never did.  He denies chest pain, palpitations, SOB, weakness, numbness, slurred speech, facial drooping, blurred vision.  Pt also thinks he has sleep apnea as he is very tired constantly and wakes up frequently at night.      Trop neg,   Tele: Event of AV dissociation/Mobitz I/First degree   Echo : normal EF, Grade II DD    -cont on tele, avoid all AV rudy blockers  -discussed need for PPM with pt/ family in the setting of third degree HB and was admitted with syncope and lightheadedness; currently agreeing for PPM, will make arrangements for transfer to [facility]   -[facility] with diuretics for LE edema/BP control   -TSH WNL  -pulm following, need sleep study as outpatient

## 2019-05-30 NOTE — DIETITIAN INITIAL EVALUATION ADULT. - NS AS NUTRI INTERV ED CONTENT
Purpose of the nutrition education/Nutrition relationship to health/disease/Survival information/Priority modifications/Recommended modifications/Cardiac- [facility] Nutrition Therapy

## 2019-05-30 NOTE — PROGRESS NOTE ADULT - SUBJECTIVE AND OBJECTIVE BOX
INTERVAL HPI:  58 year old man with HTN, Morbid Obesity S/P Gastric bypass,  DM and heart blocks  Presented  to ED with complaint of several episodes of lightheadedness and 1 episode of Syncope earlier today.  He states that he has a history of Heart block and was being considered for PPM.  He denies chest pain but reports being uncomfortable in chest, had profuse sweating at time of dizziness.  Reports poor nocturnal sleep, apnea episodes noted by wife and has day time tiredness.  No tobacco abuse but uses alcohol on regular basis.    OVERNIGHT EVENTS:  did not tolerate BIPAP but did better with CPAP 10. SPO2 74% during sleep per RN    Vital Signs Last 24 Hrs  T(C): 35.8 (30 May 2019 10:47), Max: 36.2 (29 May 2019 16:21)  T(F): 96.4 (30 May 2019 10:47), Max: 97.2 (29 May 2019 16:21)  HR: 56 (30 May 2019 12:31) (48 - 80)  BP: 93/60 (30 May 2019 10:47) (93/60 - 152/91)  BP(mean): --  RR: 18 (30 May 2019 10:47) (18 - 18)  SpO2: 96% (30 May 2019 12:31) (93% - 99%)    PHYSICAL EXAM:  GEN:         Awake, responsive and comfortable.  HEENT:    Normal.    RESP:        no wheezing.  CVS:          Regular rate and rhythm.   ABD:         Soft, non-tender, non-distended;     MEDICATIONS  (STANDING):  furosemide    Tablet 40 milliGRAM(s) Oral daily  heparin  Injectable 5000 Unit(s) SubCutaneous every 8 hours  hydrochlorothiazide 25 milliGRAM(s) Oral daily  lisinopril 10 milliGRAM(s) Oral daily  pantoprazole    Tablet 40 milliGRAM(s) Oral before breakfast    MEDICATIONS  (PRN):  aluminum hydroxide/magnesium hydroxide/simethicone Suspension 30 milliLiter(s) Oral every 4 hours PRN Dyspepsia    LABS:                        14.1   8.11  )-----------( 147      ( 29 May 2019 07:47 )             45.9     05-    140  |  104  |  15  ----------------------------<  104<H>  4.3   |  29  |  1.00    Ca    8.3<L>      29 May 2019 07:47    TPro  7.3  /  Alb  2.8<L>  /  TBili  0.4  /  DBili  x   /  AST  18  /  ALT  20  /  AlkPhos  77      PT/INR - ( 28 May 2019 18:09 )   PT: 12.9 sec;   INR: 1.15 ratio      PTT - ( 28 May 2019 18:09 )  PTT:19.4 sec   @ 18:26  pH: 7.36  pCO2: 55  pO2: 58  SaO2: 87    RADIOLOGY & ADDITIONAL STUDIES:  < from: TTE Echo Doppler w/o Cont (19 @ 10:56) >     EXAM:  TTE WO CON COMPLETE W DOPPL      PROCEDURE DATE:  2019      INTERPRETATION:  REPORT:    TRANSTHORACIC ECHOCARDIOGRAM REPORT    Patient Name:   YAMINI ANN Patient Location: [facility] Rec #:  [id number]    Accession #: [id number]  Account #:                    Height:           67.7 in 172.0 cm  YOB: 1960     Weight:           356.9 lb 161.90 kg  Patient Age:    58 years      BSA:              2.61 m²  Patient Gender: M             BP:               130/74 mmHg       Date of Exam:        2019 10:56:17 AM  Sonographer:         SHAN  Referring Physician: RITU    Procedure:     2D Echo/Doppler/Color Doppler Complete.  Indications:   Abnormal electrocardiogram [ECG] [EKG] - R94.31  Diagnosis: Abnormal electrocardiogram [ECG] [EKG] - R94.31  Study Details: Technically suboptimal study. Study quality was adversely                 affected due to patient obesity.    2D AND M-MODE MEASUREMENTS (normal ranges within parentheses):  Left Ventricle:                  Normal   Aorta/Left Atrium:            Normal  IVSd (2D):              1.49 cm (0.7-1.1) Aortic Root (2D):  3.13 cm   (2.4-3.7)  LVPWd (2D):             1.31 cm (0.7-1.1) Left Atrium (2D):  3.90 cm   (1.9-4.0)  LVIDd (2D):          4.64 cm (3.4-5.7) Right Ventricle:  LVIDs (2D):             3.40 cm           TAPSE:           2.19 cm  LV FS (2D):             26.8 %   (>25%)  Relative Wall Thickness  0.57    (<0.42)    LV DIASTOLIC FUNCTION:  MV Peak E: 1.04 m/s Decel Time: 186 msec  MV Peak A: 0.98 m/s  E/A Ratio: 1.06    SPECTRAL DOPPLER ANALYSIS (where applicable):  Mitral Valve:  MV P1/2 Time: 54.08 msec  MV Area, PHT: 4.07 cm²    Aortic Valve: AoV Max Trever: 1.46 m/s AoV Peak P.5 mmHg AoV Mean P.2 mmHg    LVOT Vmax: 1.28 m/s LVOT VTI: 0.230 m LVOT Diameter: 2.09 cm    AoV Area, Vmax: 2.99 cm² AoV Area, VTI: 2.81 cm² AoV Area, Vmn: 2.27 cm²    Tricuspid Valve and PA/RV Systolic Pressure: TR Max Velocity: 2.08 m/s RA   Pressure: 5 mmHg RVSP/PASP: 22.4mmHg    PHYSICIAN INTERPRETATION:  Left Ventricle: The left ventricular internal cavity size is normal. Left   ventricular wall thickness is mildly increased.  Left ventricular ejection fraction, by visual estimation, is 55 to 60%.   Spectral Doppler shows pseudonormal pattern of left ventricular   myocardial filling (Grade II diastolic dysfunction).  Right Ventricle: Normal right ventricular size and function.  Left Atrium: The left atrium is normal in size.  Right Atrium: The right atrium isnormal in size.  Pericardium: There is no evidence of pericardial effusion.  Mitral Valve: Thickening of the anterior mitral valve leaflet. Trace   mitral valve regurgitation is seen.  Tricuspid Valve: Structurally normal tricuspid valve, with normalleaflet   excursion. Trivial tricuspid regurgitation is visualized.  Aortic Valve: Normal trileaflet aortic valve with normal opening. Peak   transaortic gradient equals 8.5 mmHg, mean transaortic gradient equals   4.2 mmHg, the calculated aortic valve area equals 2.81 cm² by the   continuity equation consistent with normally opening aortic valve.  Pulmonic Valve: The pulmonic valve was not well visualized.  Aorta: The aortic root is normal in size and structure.     Summary:   1. Left ventricular ejection fraction, by visual estimation, is 55 to   60%.   2. Mildly increased LV wall thickness.   3. Normal left ventricular internal cavity size.   4. Spectral Doppler shows pseudonormal pattern of left ventricular   myocardial filling (Grade II diastolic dysfunction).   5. There is mild left ventricular hypertrophy.   6. Normal right ventricular size and function.   7. The left atrium is normal in size.   8. The right atrium is normal in size.   9. There is no evidence of pericardial effusion.  10. Thickening of the anterior mitral valve leaflet.  11. Trace mitral valve regurgitation.  12. Structurally normal tricuspid valve, with normal leaflet excursion.  13. Normal trileaflet aortic valve with normal opening.    [id number] Harmony Raymond MDMD  Electronically signed on 2019 at 8:41:43 PM     HARMONY RAYMOND M.D.; Attending Cardiologist  This document has been electronically signed. May 29 2019 10:56AM      ASSESSMENT AND PLAN:  ·	Syncope.  ·	AV block.  ·	Chronic Diastolic CHF Grade II  ·	Chronic hypoxia hypercarbia,  ·	Morbid Obesity.  ·	Suspect HAYLEE.  ·	HTN.  ·	DM.      Will change to CPAP 10.  May need home O2.  Cardiology following.

## 2019-05-30 NOTE — DIETITIAN INITIAL EVALUATION ADULT. - PERTINENT MEDS FT
MEDICATIONS  (STANDING):  furosemide    Tablet 40 milliGRAM(s) Oral daily  heparin  Injectable 5000 Unit(s) SubCutaneous every 8 hours  hydrochlorothiazide 25 milliGRAM(s) Oral daily  lisinopril 10 milliGRAM(s) Oral daily  pantoprazole    Tablet 40 milliGRAM(s) Oral before breakfast    MEDICATIONS  (PRN):  aluminum hydroxide/magnesium hydroxide/simethicone Suspension 30 milliLiter(s) Oral every 4 hours PRN Dyspepsia

## 2019-05-30 NOTE — PROGRESS NOTE ADULT - PROBLEM SELECTOR PLAN 2
Likely etiology of Syncope  Keep pacer pads on patient  Telemetry monitoring  Bradycardia overnight c/w home meds

## 2019-05-31 ENCOUNTER — INPATIENT (INPATIENT)
Facility: HOSPITAL | Age: 59
LOS: 3 days | Discharge: HOME | DRG: 243 | End: 2019-06-04
Attending: HOSPITALIST | Admitting: INTERNAL MEDICINE
Payer: MEDICAID

## 2019-05-31 VITALS
HEART RATE: 62 BPM | TEMPERATURE: 96 F | RESPIRATION RATE: 16 BRPM | DIASTOLIC BLOOD PRESSURE: 61 MMHG | OXYGEN SATURATION: 93 % | SYSTOLIC BLOOD PRESSURE: 117 MMHG

## 2019-05-31 VITALS
HEART RATE: 53 BPM | RESPIRATION RATE: 17 BRPM | OXYGEN SATURATION: 98 % | HEIGHT: 68 IN | WEIGHT: 315 LBS | DIASTOLIC BLOOD PRESSURE: 84 MMHG | SYSTOLIC BLOOD PRESSURE: 129 MMHG

## 2019-05-31 DIAGNOSIS — Z29.9 ENCOUNTER FOR PROPHYLACTIC MEASURES, UNSPECIFIED: ICD-10-CM

## 2019-05-31 DIAGNOSIS — H40.9 UNSPECIFIED GLAUCOMA: ICD-10-CM

## 2019-05-31 DIAGNOSIS — E11.9 TYPE 2 DIABETES MELLITUS WITHOUT COMPLICATIONS: ICD-10-CM

## 2019-05-31 DIAGNOSIS — I44.30 UNSPECIFIED ATRIOVENTRICULAR BLOCK: ICD-10-CM

## 2019-05-31 DIAGNOSIS — I45.9 CONDUCTION DISORDER, UNSPECIFIED: ICD-10-CM

## 2019-05-31 DIAGNOSIS — I10 ESSENTIAL (PRIMARY) HYPERTENSION: ICD-10-CM

## 2019-05-31 DIAGNOSIS — I50.32 CHRONIC DIASTOLIC (CONGESTIVE) HEART FAILURE: ICD-10-CM

## 2019-05-31 DIAGNOSIS — Z78.9 OTHER SPECIFIED HEALTH STATUS: ICD-10-CM

## 2019-05-31 DIAGNOSIS — G47.33 OBSTRUCTIVE SLEEP APNEA (ADULT) (PEDIATRIC): ICD-10-CM

## 2019-05-31 DIAGNOSIS — Z98.84 BARIATRIC SURGERY STATUS: Chronic | ICD-10-CM

## 2019-05-31 DIAGNOSIS — K21.9 GASTRO-ESOPHAGEAL REFLUX DISEASE WITHOUT ESOPHAGITIS: ICD-10-CM

## 2019-05-31 LAB
BLD GP AB SCN SERPL QL: NEGATIVE — SIGNIFICANT CHANGE UP
GLUCOSE BLDC GLUCOMTR-MCNC: 104 MG/DL — HIGH (ref 70–99)
GLUCOSE BLDC GLUCOMTR-MCNC: 118 MG/DL — HIGH (ref 70–99)
GLUCOSE BLDC GLUCOMTR-MCNC: 152 MG/DL — HIGH (ref 70–99)
RH IG SCN BLD-IMP: POSITIVE — SIGNIFICANT CHANGE UP
RH IG SCN BLD-IMP: POSITIVE — SIGNIFICANT CHANGE UP

## 2019-05-31 PROCEDURE — 93010 ELECTROCARDIOGRAM REPORT: CPT | Mod: 76

## 2019-05-31 PROCEDURE — 71045 X-RAY EXAM CHEST 1 VIEW: CPT | Mod: 26

## 2019-05-31 PROCEDURE — 99223 1ST HOSP IP/OBS HIGH 75: CPT

## 2019-05-31 PROCEDURE — 99233 SBSQ HOSP IP/OBS HIGH 50: CPT

## 2019-05-31 RX ORDER — INSULIN LISPRO 100/ML
VIAL (ML) SUBCUTANEOUS
Refills: 0 | Status: DISCONTINUED | OUTPATIENT
Start: 2019-05-31 | End: 2019-06-04

## 2019-05-31 RX ORDER — ACETAMINOPHEN 500 MG
650 TABLET ORAL EVERY 6 HOURS
Refills: 0 | Status: DISCONTINUED | OUTPATIENT
Start: 2019-05-31 | End: 2019-06-04

## 2019-05-31 RX ORDER — FUROSEMIDE 40 MG
40 TABLET ORAL DAILY
Refills: 0 | Status: DISCONTINUED | OUTPATIENT
Start: 2019-05-31 | End: 2019-06-04

## 2019-05-31 RX ORDER — ATORVASTATIN CALCIUM 80 MG/1
20 TABLET, FILM COATED ORAL AT BEDTIME
Refills: 0 | Status: DISCONTINUED | OUTPATIENT
Start: 2019-05-31 | End: 2019-06-04

## 2019-05-31 RX ORDER — GLUCAGON INJECTION, SOLUTION 0.5 MG/.1ML
1 INJECTION, SOLUTION SUBCUTANEOUS ONCE
Refills: 0 | Status: DISCONTINUED | OUTPATIENT
Start: 2019-05-31 | End: 2019-06-04

## 2019-05-31 RX ORDER — SODIUM CHLORIDE 9 MG/ML
1000 INJECTION, SOLUTION INTRAVENOUS
Refills: 0 | Status: DISCONTINUED | OUTPATIENT
Start: 2019-05-31 | End: 2019-06-04

## 2019-05-31 RX ORDER — DEXTROSE 50 % IN WATER 50 %
15 SYRINGE (ML) INTRAVENOUS ONCE
Refills: 0 | Status: DISCONTINUED | OUTPATIENT
Start: 2019-05-31 | End: 2019-06-04

## 2019-05-31 RX ORDER — PANTOPRAZOLE SODIUM 20 MG/1
40 TABLET, DELAYED RELEASE ORAL
Refills: 0 | Status: DISCONTINUED | OUTPATIENT
Start: 2019-05-31 | End: 2019-06-04

## 2019-05-31 RX ORDER — DORZOLAMIDE HYDROCHLORIDE TIMOLOL MALEATE 20; 5 MG/ML; MG/ML
1 SOLUTION/ DROPS OPHTHALMIC
Qty: 0 | Refills: 0 | DISCHARGE

## 2019-05-31 RX ORDER — ENOXAPARIN SODIUM 100 MG/ML
40 INJECTION SUBCUTANEOUS EVERY 24 HOURS
Refills: 0 | Status: DISCONTINUED | OUTPATIENT
Start: 2019-05-31 | End: 2019-06-02

## 2019-05-31 RX ORDER — POTASSIUM CHLORIDE 20 MEQ
1 PACKET (EA) ORAL
Qty: 0 | Refills: 0 | DISCHARGE

## 2019-05-31 RX ORDER — LISINOPRIL 2.5 MG/1
1 TABLET ORAL
Qty: 0 | Refills: 0 | DISCHARGE

## 2019-05-31 RX ORDER — ATORVASTATIN CALCIUM 80 MG/1
1 TABLET, FILM COATED ORAL
Qty: 0 | Refills: 0 | DISCHARGE

## 2019-05-31 RX ORDER — DEXTROSE 50 % IN WATER 50 %
12.5 SYRINGE (ML) INTRAVENOUS ONCE
Refills: 0 | Status: DISCONTINUED | OUTPATIENT
Start: 2019-05-31 | End: 2019-06-04

## 2019-05-31 RX ORDER — DEXTROSE 50 % IN WATER 50 %
25 SYRINGE (ML) INTRAVENOUS ONCE
Refills: 0 | Status: DISCONTINUED | OUTPATIENT
Start: 2019-05-31 | End: 2019-06-04

## 2019-05-31 RX ORDER — FUROSEMIDE 40 MG
1 TABLET ORAL
Qty: 0 | Refills: 0 | DISCHARGE

## 2019-05-31 RX ORDER — INSULIN LISPRO 100/ML
VIAL (ML) SUBCUTANEOUS AT BEDTIME
Refills: 0 | Status: DISCONTINUED | OUTPATIENT
Start: 2019-05-31 | End: 2019-06-04

## 2019-05-31 RX ORDER — ONDANSETRON 8 MG/1
4 TABLET, FILM COATED ORAL ONCE
Refills: 0 | Status: COMPLETED | OUTPATIENT
Start: 2019-05-31 | End: 2019-05-31

## 2019-05-31 RX ORDER — HYDROCHLOROTHIAZIDE 25 MG
25 TABLET ORAL DAILY
Refills: 0 | Status: DISCONTINUED | OUTPATIENT
Start: 2019-05-31 | End: 2019-06-04

## 2019-05-31 RX ORDER — LISINOPRIL 2.5 MG/1
10 TABLET ORAL DAILY
Refills: 0 | Status: DISCONTINUED | OUTPATIENT
Start: 2019-05-31 | End: 2019-06-04

## 2019-05-31 RX ORDER — DORZOLAMIDE HYDROCHLORIDE TIMOLOL MALEATE 20; 5 MG/ML; MG/ML
1 SOLUTION/ DROPS OPHTHALMIC
Refills: 0 | Status: DISCONTINUED | OUTPATIENT
Start: 2019-05-31 | End: 2019-06-04

## 2019-05-31 RX ADMIN — ENOXAPARIN SODIUM 40 MILLIGRAM(S): 100 INJECTION SUBCUTANEOUS at 17:40

## 2019-05-31 RX ADMIN — ATORVASTATIN CALCIUM 20 MILLIGRAM(S): 80 TABLET, FILM COATED ORAL at 21:54

## 2019-05-31 RX ADMIN — LISINOPRIL 10 MILLIGRAM(S): 2.5 TABLET ORAL at 05:23

## 2019-05-31 RX ADMIN — Medication 650 MILLIGRAM(S): at 17:40

## 2019-05-31 RX ADMIN — Medication 650 MILLIGRAM(S): at 19:00

## 2019-05-31 RX ADMIN — Medication 25 MILLIGRAM(S): at 05:23

## 2019-05-31 RX ADMIN — PANTOPRAZOLE SODIUM 40 MILLIGRAM(S): 20 TABLET, DELAYED RELEASE ORAL at 20:37

## 2019-05-31 RX ADMIN — ONDANSETRON 4 MILLIGRAM(S): 8 TABLET, FILM COATED ORAL at 17:40

## 2019-05-31 RX ADMIN — DORZOLAMIDE HYDROCHLORIDE TIMOLOL MALEATE 1 DROP(S): 20; 5 SOLUTION/ DROPS OPHTHALMIC at 17:45

## 2019-05-31 RX ADMIN — HEPARIN SODIUM 5000 UNIT(S): 5000 INJECTION INTRAVENOUS; SUBCUTANEOUS at 07:00

## 2019-05-31 RX ADMIN — Medication 40 MILLIGRAM(S): at 05:24

## 2019-05-31 NOTE — H&P ADULT - PROBLEM SELECTOR PLAN 6
-Last drink reportedly 10 days ago and patient says only drank 2 drinks per night.   -Unlikely to go into any withdrawal at this point.

## 2019-05-31 NOTE — CHART NOTE - NSCHARTNOTEFT_GEN_A_CORE
called at bed side by nurse   patient experiencing sudden "blurry vision"  went to bed side, pt seen and examined     pt denies further blurry vision, he states only "lasted a few seconds" but now with HA 3/10 and + nausea   also reports chest "discomfort", located on left chest, non radiating, intermittent, that started after he ate dinner, reports he experienced it before at [facility] hosp , worse when "taking deep breaths" rated 1/10 ( minior)      vs stable /96 HR 78 RR 16 min   no new events reported on tele   STAT ECG unchanged from previous showing Livan pt for PPM implant on Monday with Dr Neal   O2 2L NC applied   Tylenol 650mg PO X1 ordered   Zofran 4mg IV X1 ordered    Pt HA and CP completely relieved   no further vision changes reported   vs remain stable   medicine team made aware of above findings / tx   family at bed side     will cont to monitor on tele called at bed side by nurse   patient experiencing sudden "blurry vision"  went to bed side, pt seen and examined     pt denies further blurry vision, he states only "lasted a few seconds" but now with HA 3/10 and + nausea   also reports chest "discomfort", located on left chest, non radiating, intermittent, that started after he ate dinner, reports he experienced it before at [facility] , worse when "taking deep breaths" rated 1/10 ( minior)      vs stable /96 HR 78 RR 16 min   no new events reported on tele   STAT ECG unchanged from previous showing [facility] pt for PPM implant on Monday with Dr Neal   O2 2L NC applied   Tylenol 650mg PO X1 ordered   Zofran 4mg IV X1 ordered    Pt HA and CP completely relieved   no further vision changes reported   vs remain stable   medicine team made aware of above findings / tx  ( Dr Banda)  family at bed side     will cont to monitor on tele

## 2019-05-31 NOTE — PROGRESS NOTE ADULT - PROBLEM SELECTOR PLAN 9
Heparin 5000 units SC q8h for DVT prophylaxis  General precautions
Heparin 5000 units SC q8h for DVT prophylaxis  General precautions

## 2019-05-31 NOTE — H&P ADULT - NSHPSOCIALHISTORY_GEN_ALL_CORE
. Works as self-employed. Mostly sedentary job. Denies smoking. From [place]. Reports drinking about 2 alcoholic drinks per day. Last drink about 10 days ago.

## 2019-05-31 NOTE — H&P ADULT - NSHPLABSRESULTS_GEN_ALL_CORE
LABS:    CAPILLARY BLOOD GLUCOSE    POCT Blood Glucose.: 118 mg/dL (31 May 2019 13:30)    Lactate Trend    RADIOLOGY/CARDIOLOGY:    ECHO  PHYSICIAN INTERPRETATION:  Left Ventricle: The left ventricular internal cavity size is normal. Left   ventricular wall thickness is mildly increased.  Left ventricular ejection fraction, by visual estimation, is 55 to 60%.   Spectral Doppler shows pseudonormal pattern of left ventricular   myocardial filling (Grade II diastolic dysfunction).  Right Ventricle: Normal right ventricular size and function.  Left Atrium: The left atrium is normal in size.  Right Atrium: The right atrium is normal in size.  Pericardium: There is no evidence of pericardial effusion.  Mitral Valve: Thickening of the anterior mitral valve leaflet. Trace   mitral valve regurgitation is seen.  Tricuspid Valve: Structurally normal tricuspid valve, with normal leaflet   excursion. Trivial tricuspid regurgitation is visualized.  Aortic Valve: Normal trileaflet aortic valve with normal opening. Peak   transaortic gradient equals 8.5 mmHg, mean transaortic gradient equals   4.2 mmHg, the calculated aortic valve area equals 2.81 cm² by the   continuity equation consistent with normally opening aortic valve.  Pulmonic Valve: The pulmonic valve was not well visualized.  Aorta: The aortic root is normal in size and structure.       Summary:   1. Left ventricular ejection fraction, by visual estimation, is 55 to   60%.   2. Mildly increased LV wall thickness.   3. Normal left ventricular internal cavity size.   4. Spectral Doppler shows pseudonormal pattern of left ventricular   myocardial filling (Grade II diastolic dysfunction).   5. There is mild left ventricular hypertrophy.   6. Normal right ventricular size and function.   7. The left atrium is normal in size.   8. The right atrium is normal in size.   9. There is no evidence of pericardial effusion.  10. Thickening of the anterior mitral valve leaflet.  11. Trace mitral valve regurgitation.  12. Structurally normal tricuspid valve, with normal leaflet excursion.  13. Normal trileaflet aortic valve with normal opening.        RADIOLOGY & ADDITIONAL STUDIES:  < from: Xray Chest 1 View- PORTABLE-Urgent (05.28.19 @ 17:24) >    EXAM:  XR CHEST PORTABLE URGENT 1V                          PROCEDURE DATE:  05/28/2019      INTERPRETATION:  CLINICAL STATEMENT: Chest Pain.  Check for lung infiltration area    TECHNIQUE: AP view of the chest.    COMPARISON: None.     The cardiomediastinal silhouette is enlarged but the ananya are not   enlarged. The trachea is midline. There is no focal infiltrate or pleural   effusion. The osseous structures are intact.    IMPRESSION:    Cardiomegaly.  No parenchymal infiltration. LABS:    CAPILLARY BLOOD GLUCOSE    POCT Blood Glucose.: 118 mg/dL (31 May 2019 13:30)    Lactate Trend    RADIOLOGY/CARDIOLOGY:    ECHO  PHYSICIAN INTERPRETATION:  Left Ventricle: The left ventricular internal cavity size is normal. Left   ventricular wall thickness is mildly increased.  Left ventricular ejection fraction, by visual estimation, is 55 to 60%.   Spectral Doppler shows pseudonormal pattern of left ventricular   myocardial filling (Grade II diastolic dysfunction).  Right Ventricle: Normal right ventricular size and function.  Left Atrium: The left atrium is normal in size.  Right Atrium: The right atrium is normal in size.  Pericardium: There is no evidence of pericardial effusion.  Mitral Valve: Thickening of the anterior mitral valve leaflet. Trace   mitral valve regurgitation is seen.  Tricuspid Valve: Structurally normal tricuspid valve, with normal leaflet   excursion. Trivial tricuspid regurgitation is visualized.  Aortic Valve: Normal trileaflet aortic valve with normal opening. Peak   transaortic gradient equals 8.5 mmHg, mean transaortic gradient equals   4.2 mmHg, the calculated aortic valve area equals 2.81 cm² by the   continuity equation consistent with normally opening aortic valve.  Pulmonic Valve: The pulmonic valve was not well visualized.  Aorta: The aortic root is normal in size and structure.       Summary:   1. Left ventricular ejection fraction, by visual estimation, is 55 to   60%.   2. Mildly increased LV wall thickness.   3. Normal left ventricular internal cavity size.   4. Spectral Doppler shows pseudonormal pattern of left ventricular   myocardial filling (Grade II diastolic dysfunction).   5. There is mild left ventricular hypertrophy.   6. Normal right ventricular size and function.   7. The left atrium is normal in size.   8. The right atrium is normal in size.   9. There is no evidence of pericardial effusion.  10. Thickening of the anterior mitral valve leaflet.  11. Trace mitral valve regurgitation.  12. Structurally normal tricuspid valve, with normal leaflet excursion.  13. Normal trileaflet aortic valve with normal opening.        RADIOLOGY & ADDITIONAL STUDIES:  < from: Xray Chest 1 View- PORTABLE-Urgent (05.28.19 @ 17:24) >    EXAM:  XR CHEST PORTABLE URGENT 1V                          PROCEDURE DATE:  05/28/2019      INTERPRETATION:  CLINICAL STATEMENT: Chest Pain.  Check for lung infiltration area    TECHNIQUE: AP view of the chest.    COMPARISON: None.     The cardiomediastinal silhouette is enlarged but the ananya are not   enlarged. The trachea is midline. There is no focal infiltrate or pleural   effusion. The osseous structures are intact.    IMPRESSION:    Cardiomegaly.  No parenchymal infiltration.    Telemetry reviewed by me: Sinus 60-80's. Episodes of 2nd degree AV block Type 1 and episodes of AV dissociation/3rd degree HB.

## 2019-05-31 NOTE — H&P CARDIOLOGY - FAMILY HISTORY
Father  Still living? Unknown  Family history of acute myocardial infarction, Age at diagnosis: Age Unknown     Uncle  Still living? Unknown  Family history of acute myocardial infarction, Age at diagnosis: Age Unknown

## 2019-05-31 NOTE — H&P ADULT - PROBLEM SELECTOR PLAN 4
-Hold home metformin.   -LISSA QAC/HS for now.   -Diabetic DASH diet.  -C/w atorvastatin for hyperlipidemia.  -History of bariatric surgery about 10 years ago in [place].   -Nutrition eval.

## 2019-05-31 NOTE — H&P ADULT - NSICDXPASTMEDICALHX_GEN_ALL_CORE_FT
PAST MEDICAL HISTORY:  Diabetes mellitus     GERD (gastroesophageal reflux disease)     Glaucoma     Heart block, AV     HLD (hyperlipidemia)     Hydrocele     Hypercarbia     Hypertension     Iron deficiency anemia     Obese     HAYLEE treated with BiPAP     Retinal detachment

## 2019-05-31 NOTE — H&P ADULT - NSHPREVIEWOFSYSTEMS_GEN_ALL_CORE
REVIEW OF SYSTEMS:    CONSTITUTIONAL: Sometimes fatigue. No fevers or chills  ENMT:  No ear pain, No vertigo or throat pain  EYES: No visual changes  or photophobia  NECK: No pain or stiffness  RESPIRATORY: No cough, wheezing, hemoptysis; No shortness of breath  CARDIOVASCULAR: No chest pain or palpitations  GASTROINTESTINAL: No abdominal or epigastric pain. No nausea, vomiting, or hematemesis; No diarrhea or constipation. No melena or hematochezia.  MUSCULOSKELETAL: No joint swelling  or warmth.  GENITOURINARY: No dysuria, frequency or hematuria  NEUROLOGICAL: No numbness or weakness  PSYCHIATRIC: No depression or anhedonia.  ENDOCRINE: No sx hypoglycemic episodes.  SKIN: No itching, burning, rashes, or lesions

## 2019-05-31 NOTE — H&P CARDIOLOGY - HISTORY OF PRESENT ILLNESS
This is a  58 year old obese man with PMH HTN presents to [facility]  ED on 5/28 with complaint of several episodes of lightheadedness and 1 episode of Syncope earlier that day.  He states that he has a history of Heart block and was being considered for PPM.  He denies chest pain, palpitations, SOB, weakness, numbness, slurred speech, facial drooping, blurred vision.  Pt also thinks he has sleep apnea as he is very tired constantly and wakes up frequently at night.  He is SOB here and is using BiPAP intermittently.  In the ED, pt's EKG on 5/28 showed Mobitz 1, cardiac enzymes normal, CXR read as clear lungs. At [facility] was consulted by both cards and pulmonary ( on Bipap an dmay need home O2 as per their note).  Today transferred to [facility] for pacemaker implant as pt is in second degree AVB.  Pt NPO for procedure, EP team aware of pt's arrival.   EKG: sinus rhythm, 2nd degree AV block.        RADIOLOGY & ADDITIONAL STUDIES:  < from: Xray Chest 1 View- PORTABLE-Urgent (05.28.19 @ 17:24) >    EXAM:  XR CHEST PORTABLE URGENT 1V                          PROCEDURE DATE:  05/28/2019      INTERPRETATION:  CLINICAL STATEMENT: Chest Pain.  Check for lung infiltration area    TECHNIQUE: AP view of the chest.    COMPARISON: None.     The cardiomediastinal silhouette is enlarged but the ananya are not   enlarged. The trachea is midline. There is no focal infiltrate or pleural   effusion. The osseous structures are intact.    IMPRESSION:    Cardiomegaly.  No parenchymal infiltration. This is a  58 year old obese man with PMH HTN, HLD, Dm type 2 ( last A1c on 5/29 was 7.0, well managed w/o complications as per patient), HAYLEE? whom  presents to [facility]  ED on 5/28 with complaint of several episodes of lightheadedness and 1 episode of Syncope earlier that day.  He states that he has a history of Heart block and was being considered for PPM.  He denies chest pain, palpitations, SOB, weakness, numbness, slurred speech, facial drooping, blurred vision.  Pt also thinks he has sleep apnea as he is very tired constantly and wakes up frequently at night.  At [facility] ED w/ SOB and was using BiPAP intermittently.  In the ED, pt's EKG on 5/28 showed Mobitz 1, cardiac enzymes normal, CXR read as clear lungs. At [facility] was consulted by both cards and pulmonary ( on Bipap and may need home O2 as per their note).  Today transferred to [facility] for pacemaker implant as pt is in second degree AVB.  Pt NPO for procedure, EP team aware of pt's arrival.       AoV Area, Vmax: 2.99 cm² AoV Area, VTI: 2.81 cm² AoV Area, Vmn: 2.27 cm²    Tricuspid Valve and PA/RV Systolic Pressure: TR Max Velocity: 2.08 m/s RA   Pressure: 5 mmHg RVSP/PASP: 22.4 mmHg       PHYSICIAN INTERPRETATION:  Left Ventricle: The left ventricular internal cavity size is normal. Left   ventricular wall thickness is mildly increased.  Left ventricular ejection fraction, by visual estimation, is 55 to 60%.   Spectral Doppler shows pseudonormal pattern of left ventricular   myocardial filling (Grade II diastolic dysfunction).  Right Ventricle: Normal right ventricular size and function.  Left Atrium: The left atrium is normal in size.  Right Atrium: The right atrium is normal in size.  Pericardium: There is no evidence of pericardial effusion.  Mitral Valve: Thickening of the anterior mitral valve leaflet. Trace   mitral valve regurgitation is seen.  Tricuspid Valve: Structurally normal tricuspid valve, with normal leaflet   excursion. Trivial tricuspid regurgitation is visualized.  Aortic Valve: Normal trileaflet aortic valve with normal opening. Peak   transaortic gradient equals 8.5 mmHg, mean transaortic gradient equals   4.2 mmHg, the calculated aortic valve area equals 2.81 cm² by the   continuity equation consistent with normally opening aortic valve.  Pulmonic Valve: The pulmonic valve was not well visualized.  Aorta: The aortic root is normal in size and structure.       Summary:   1. Left ventricular ejection fraction, by visual estimation, is 55 to   60%.   2. Mildly increased LV wall thickness.   3. Normal left ventricular internal cavity size.   4. Spectral Doppler shows pseudonormal pattern of left ventricular   myocardial filling (Grade II diastolic dysfunction).   5. There is mild left ventricular hypertrophy.   6. Normal right ventricular size and function.   7. The left atrium is normal in size.   8. The right atrium is normal in size.   9. There is no evidence of pericardial effusion.  10. Thickening of the anterior mitral valve leaflet.  11. Trace mitral valve regurgitation.  12. Structurally normal tricuspid valve, with normal leaflet excursion.  13. Normal trileaflet aortic valve with normal opening.        RADIOLOGY & ADDITIONAL STUDIES:  < from: Xray Chest 1 View- PORTABLE-Urgent (05.28.19 @ 17:24) >    EXAM:  XR CHEST PORTABLE URGENT 1V                          PROCEDURE DATE:  05/28/2019      INTERPRETATION:  CLINICAL STATEMENT: Chest Pain.  Check for lung infiltration area    TECHNIQUE: AP view of the chest.    COMPARISON: None.     The cardiomediastinal silhouette is enlarged but the ananya are not   enlarged. The trachea is midline. There is no focal infiltrate or pleural   effusion. The osseous structures are intact.    IMPRESSION:    Cardiomegaly.  No parenchymal infiltration. This is a  58 year old obese manle, ETOH user ( last drink 10 days ago as per patient), with PMH HTN, HLD, Dm type 2 ( last A1c on 5/29 was 7.0, well managed w/o complications as per patient, on Metformin), glaucoma, HAYLEE? whom  presented to [facility]  ED on 5/28 with complaint of several episodes of lightheadedness and 1 episode of Syncope earlier that day.  He states that he has a history of Heart block and was being considered for PPM.  He denies chest pain, palpitations, SOB, weakness, numbness, slurred speech, facial drooping, blurred vision.  Pt also thinks he has sleep apnea as he is very tired constantly and wakes up frequently at night.  At [facility] ED w/ SOB and was using BiPAP intermittently.  In the ED, pt's EKG on 5/28 showed Mobitz 1, cardiac enzymes normal, CXR read as clear lungs. At [facility] was consulted by both cards and pulmonary ( on Bipap and may need home O2 as per their note).  Today transferred to [facility] for pacemaker implant as pt is in second degree AVB.  Pt NPO for procedure, EP team aware of pt's arrival.       ECHO     PHYSICIAN INTERPRETATION:  Left Ventricle: The left ventricular internal cavity size is normal. Left   ventricular wall thickness is mildly increased.  Left ventricular ejection fraction, by visual estimation, is 55 to 60%.   Spectral Doppler shows pseudonormal pattern of left ventricular   myocardial filling (Grade II diastolic dysfunction).  Right Ventricle: Normal right ventricular size and function.  Left Atrium: The left atrium is normal in size.  Right Atrium: The right atrium is normal in size.  Pericardium: There is no evidence of pericardial effusion.  Mitral Valve: Thickening of the anterior mitral valve leaflet. Trace   mitral valve regurgitation is seen.  Tricuspid Valve: Structurally normal tricuspid valve, with normal leaflet   excursion. Trivial tricuspid regurgitation is visualized.  Aortic Valve: Normal trileaflet aortic valve with normal opening. Peak   transaortic gradient equals 8.5 mmHg, mean transaortic gradient equals   4.2 mmHg, the calculated aortic valve area equals 2.81 cm² by the   continuity equation consistent with normally opening aortic valve.  Pulmonic Valve: The pulmonic valve was not well visualized.  Aorta: The aortic root is normal in size and structure.       Summary:   1. Left ventricular ejection fraction, by visual estimation, is 55 to   60%.   2. Mildly increased LV wall thickness.   3. Normal left ventricular internal cavity size.   4. Spectral Doppler shows pseudonormal pattern of left ventricular   myocardial filling (Grade II diastolic dysfunction).   5. There is mild left ventricular hypertrophy.   6. Normal right ventricular size and function.   7. The left atrium is normal in size.   8. The right atrium is normal in size.   9. There is no evidence of pericardial effusion.  10. Thickening of the anterior mitral valve leaflet.  11. Trace mitral valve regurgitation.  12. Structurally normal tricuspid valve, with normal leaflet excursion.  13. Normal trileaflet aortic valve with normal opening.        RADIOLOGY & ADDITIONAL STUDIES:  < from: Xray Chest 1 View- PORTABLE-Urgent (05.28.19 @ 17:24) >    EXAM:  XR CHEST PORTABLE URGENT 1V                          PROCEDURE DATE:  05/28/2019      INTERPRETATION:  CLINICAL STATEMENT: Chest Pain.  Check for lung infiltration area    TECHNIQUE: AP view of the chest.    COMPARISON: None.     The cardiomediastinal silhouette is enlarged but the ananya are not   enlarged. The trachea is midline. There is no focal infiltrate or pleural   effusion. The osseous structures are intact.    IMPRESSION:    Cardiomegaly.  No parenchymal infiltration. This is a  58 year old obese manle, ETOH user ( last drink 10 days ago as per patient), with PMH HTN, HLD, Dm type 2 ( last A1c on 5/29 was 7.0, well managed w/o complications as per patient, on Metformin), glaucoma, HAYLEE? whom  presented to [facility]  ED on 5/28 with complaint of several episodes of lightheadedness and 1 episode of Syncope earlier that day.  He states that he has a history of Heart block and was being considered for PPM.  He denies chest pain, palpitations, SOB, weakness, numbness, slurred speech, facial drooping, blurred vision.  Pt also thinks he has sleep apnea as he is very tired constantly and wakes up frequently at night.  At [facility] ED w/ SOB and was using BiPAP intermittently.  In the ED, pt's EKG on 5/28 showed Mobitz 1, cardiac enzymes normal, CXR read as clear lungs. At [facility] was consulted by both cards and pulmonary ( on Bipap and may need home O2 as per their note).  Today transferred to [facility] for pacemaker implant as pt is in second degree AVB.  Pt NPO for procedure, EP team aware of pt's arrival.       ECHO  PHYSICIAN INTERPRETATION:  Left Ventricle: The left ventricular internal cavity size is normal. Left   ventricular wall thickness is mildly increased.  Left ventricular ejection fraction, by visual estimation, is 55 to 60%.   Spectral Doppler shows pseudonormal pattern of left ventricular   myocardial filling (Grade II diastolic dysfunction).  Right Ventricle: Normal right ventricular size and function.  Left Atrium: The left atrium is normal in size.  Right Atrium: The right atrium is normal in size.  Pericardium: There is no evidence of pericardial effusion.  Mitral Valve: Thickening of the anterior mitral valve leaflet. Trace   mitral valve regurgitation is seen.  Tricuspid Valve: Structurally normal tricuspid valve, with normal leaflet   excursion. Trivial tricuspid regurgitation is visualized.  Aortic Valve: Normal trileaflet aortic valve with normal opening. Peak   transaortic gradient equals 8.5 mmHg, mean transaortic gradient equals   4.2 mmHg, the calculated aortic valve area equals 2.81 cm² by the   continuity equation consistent with normally opening aortic valve.  Pulmonic Valve: The pulmonic valve was not well visualized.  Aorta: The aortic root is normal in size and structure.       Summary:   1. Left ventricular ejection fraction, by visual estimation, is 55 to   60%.   2. Mildly increased LV wall thickness.   3. Normal left ventricular internal cavity size.   4. Spectral Doppler shows pseudonormal pattern of left ventricular   myocardial filling (Grade II diastolic dysfunction).   5. There is mild left ventricular hypertrophy.   6. Normal right ventricular size and function.   7. The left atrium is normal in size.   8. The right atrium is normal in size.   9. There is no evidence of pericardial effusion.  10. Thickening of the anterior mitral valve leaflet.  11. Trace mitral valve regurgitation.  12. Structurally normal tricuspid valve, with normal leaflet excursion.  13. Normal trileaflet aortic valve with normal opening.        RADIOLOGY & ADDITIONAL STUDIES:  < from: Xray Chest 1 View- PORTABLE-Urgent (05.28.19 @ 17:24) >    EXAM:  XR CHEST PORTABLE URGENT 1V                          PROCEDURE DATE:  05/28/2019      INTERPRETATION:  CLINICAL STATEMENT: Chest Pain.  Check for lung infiltration area    TECHNIQUE: AP view of the chest.    COMPARISON: None.     The cardiomediastinal silhouette is enlarged but the ananya are not   enlarged. The trachea is midline. There is no focal infiltrate or pleural   effusion. The osseous structures are intact.    IMPRESSION:    Cardiomegaly.  No parenchymal infiltration. This is a  58 year old ,  obese male, ETOH user ( last drink 10 days ago as per patient), with PMH HTN, HLD, DM type 2 ( last A1c on 5/29 was 7.0, well managed w/o complications as per patient, on Metformin), glaucoma, HAYLEE?, GERD, bl hydrocele, R retinal detachment, iron def anemia.  FH significant for premature CAD ( father and uncle both had MI in their 50's).  Patient  presented to [facility]  ED on 5/28 with complaint of several episodes of lightheadedness and 1 episode of Syncope earlier that day.  He states that he has a history of Heart block and was being considered for PPM.  He denies chest pain, palpitations, SOB, weakness, numbness, slurred speech, facial drooping, blurred vision.  Pt also thinks he has sleep apnea as he is very tired constantly and wakes up frequently at night.  At [facility] ED w/ SOB and was using BiPAP intermittently.  In the ED, pt's EKG on 5/28 showed Mobitz 1, cardiac enzymes normal, CXR read as clear lungs. At [facility] was consulted by both cards and pulmonary ( on nightly Bipap and may need home O2 as per their note).  Today transferred to [facility] for pacemaker implant as pt is in second degree AVB.  Pt NPO for procedure, EP team aware of pt's arrival.      PCP LARY THOMPSON    ECHO  PHYSICIAN INTERPRETATION:  Left Ventricle: The left ventricular internal cavity size is normal. Left   ventricular wall thickness is mildly increased.  Left ventricular ejection fraction, by visual estimation, is 55 to 60%.   Spectral Doppler shows pseudonormal pattern of left ventricular   myocardial filling (Grade II diastolic dysfunction).  Right Ventricle: Normal right ventricular size and function.  Left Atrium: The left atrium is normal in size.  Right Atrium: The right atrium is normal in size.  Pericardium: There is no evidence of pericardial effusion.  Mitral Valve: Thickening of the anterior mitral valve leaflet. Trace   mitral valve regurgitation is seen.  Tricuspid Valve: Structurally normal tricuspid valve, with normal leaflet   excursion. Trivial tricuspid regurgitation is visualized.  Aortic Valve: Normal trileaflet aortic valve with normal opening. Peak   transaortic gradient equals 8.5 mmHg, mean transaortic gradient equals   4.2 mmHg, the calculated aortic valve area equals 2.81 cm² by the   continuity equation consistent with normally opening aortic valve.  Pulmonic Valve: The pulmonic valve was not well visualized.  Aorta: The aortic root is normal in size and structure.       Summary:   1. Left ventricular ejection fraction, by visual estimation, is 55 to   60%.   2. Mildly increased LV wall thickness.   3. Normal left ventricular internal cavity size.   4. Spectral Doppler shows pseudonormal pattern of left ventricular   myocardial filling (Grade II diastolic dysfunction).   5. There is mild left ventricular hypertrophy.   6. Normal right ventricular size and function.   7. The left atrium is normal in size.   8. The right atrium is normal in size.   9. There is no evidence of pericardial effusion.  10. Thickening of the anterior mitral valve leaflet.  11. Trace mitral valve regurgitation.  12. Structurally normal tricuspid valve, with normal leaflet excursion.  13. Normal trileaflet aortic valve with normal opening.        RADIOLOGY & ADDITIONAL STUDIES:  < from: Xray Chest 1 View- PORTABLE-Urgent (05.28.19 @ 17:24) >    EXAM:  XR CHEST PORTABLE URGENT 1V                          PROCEDURE DATE:  05/28/2019      INTERPRETATION:  CLINICAL STATEMENT: Chest Pain.  Check for lung infiltration area    TECHNIQUE: AP view of the chest.    COMPARISON: None.     The cardiomediastinal silhouette is enlarged but the ananya are not   enlarged. The trachea is midline. There is no focal infiltrate or pleural   effusion. The osseous structures are intact.    IMPRESSION:    Cardiomegaly.  No parenchymal infiltration. This is a  58 year old [place],  obese male, ETOH user ( last drink 10 days ago as per patient), with PMH HTN, HLD, DM type 2 ( last A1c on 5/29 was 7.0, well managed w/o complications as per patient, on Metformin, endo Maddi Mcarthur), glaucoma, HAYLEE?, GERD, bl hydrocele, R retinal detachment, iron def anemia.  FH significant for premature CAD ( father and uncle both had MI in their 50's).  Patient  presented to [facility]  ED on 5/28 with complaint of several episodes of lightheadedness and 1 episode of Syncope earlier that day.  He states that he has a history of Heart block and was being considered for PPM.  He denies chest pain, palpitations, SOB, weakness, numbness, slurred speech, facial drooping, blurred vision.  Pt also thinks he has sleep apnea as he is very tired constantly and wakes up frequently at night.  At [facility] ED w/ SOB and was using BiPAP intermittently.  In the ED, pt's EKG on 5/28 showed Mobitz 1, cardiac enzymes normal, CXR read as clear lungs. At [facility] was consulted by both cards and pulmonary ( on nightly Bipap and may need home O2 as per their note).  Today transferred to [facility] for pacemaker implant as pt is in second degree AVB.  Pt NPO for procedure, EP team aware of pt's arrival.    cards: Adithya Hernandez   PCP LARY THOMPSON    ECHO  PHYSICIAN INTERPRETATION:  Left Ventricle: The left ventricular internal cavity size is normal. Left   ventricular wall thickness is mildly increased.  Left ventricular ejection fraction, by visual estimation, is 55 to 60%.   Spectral Doppler shows pseudonormal pattern of left ventricular   myocardial filling (Grade II diastolic dysfunction).  Right Ventricle: Normal right ventricular size and function.  Left Atrium: The left atrium is normal in size.  Right Atrium: The right atrium is normal in size.  Pericardium: There is no evidence of pericardial effusion.  Mitral Valve: Thickening of the anterior mitral valve leaflet. Trace   mitral valve regurgitation is seen.  Tricuspid Valve: Structurally normal tricuspid valve, with normal leaflet   excursion. Trivial tricuspid regurgitation is visualized.  Aortic Valve: Normal trileaflet aortic valve with normal opening. Peak   transaortic gradient equals 8.5 mmHg, mean transaortic gradient equals   4.2 mmHg, the calculated aortic valve area equals 2.81 cm² by the   continuity equation consistent with normally opening aortic valve.  Pulmonic Valve: The pulmonic valve was not well visualized.  Aorta: The aortic root is normal in size and structure.       Summary:   1. Left ventricular ejection fraction, by visual estimation, is 55 to   60%.   2. Mildly increased LV wall thickness.   3. Normal left ventricular internal cavity size.   4. Spectral Doppler shows pseudonormal pattern of left ventricular   myocardial filling (Grade II diastolic dysfunction).   5. There is mild left ventricular hypertrophy.   6. Normal right ventricular size and function.   7. The left atrium is normal in size.   8. The right atrium is normal in size.   9. There is no evidence of pericardial effusion.  10. Thickening of the anterior mitral valve leaflet.  11. Trace mitral valve regurgitation.  12. Structurally normal tricuspid valve, with normal leaflet excursion.  13. Normal trileaflet aortic valve with normal opening.        RADIOLOGY & ADDITIONAL STUDIES:  < from: Xray Chest 1 View- PORTABLE-Urgent (05.28.19 @ 17:24) >    EXAM:  XR CHEST PORTABLE URGENT 1V                          PROCEDURE DATE:  05/28/2019      INTERPRETATION:  CLINICAL STATEMENT: Chest Pain.  Check for lung infiltration area    TECHNIQUE: AP view of the chest.    COMPARISON: None.     The cardiomediastinal silhouette is enlarged but the ananya are not   enlarged. The trachea is midline. There is no focal infiltrate or pleural   effusion. The osseous structures are intact.    IMPRESSION:    Cardiomegaly.  No parenchymal infiltration. This is a  58 year old ,  obese male, ETOH user ( last drink 10 days ago as per patient), with PMH HTN, HLD, DM type 2 ( last A1c on 5/29 was 7.0, well managed w/o complications as per patient, on Metformin, endo Maddi Mcarthur), glaucoma, HAYLEE?, GERD, bl hydrocele, R retinal detachment, iron def anemia.  FH significant for premature CAD ( father and uncle both had MI in their 50's).  Patient  presented to [facility]  ED on 5/28 with complaint of several episodes of lightheadedness and 1 episode of Syncope earlier that day.  He states that he has a history of Heart block and was being considered for PPM.  He denies chest pain, palpitations, SOB, weakness, numbness, slurred speech, facial drooping, blurred vision.  Pt also thinks he has sleep apnea as he is very tired constantly and wakes up frequently at night.  At [facility] ED w/ SOB and was using BiPAP intermittently.  In the ED, pt's EKG on 5/28 showed Mobitz 1, cardiac enzymes normal, CXR read as clear lungs. At [facility] was consulted by both cards and pulmonary ( on nightly Bipap and may need home O2 as per their note).  Today transferred to [facility] for pacemaker implant as pt is in second degree AVB.  EP team aware of pt's arrival. As per Dr Neal PPM re scheduled for Monday, please make sure pt is NPO after MN on Sunday, dc planning for Tuesday if no complications post procedure.     cards: Adithya Hernandez   PCP LARY THOMPSON    ECHO  PHYSICIAN INTERPRETATION:  Left Ventricle: The left ventricular internal cavity size is normal. Left   ventricular wall thickness is mildly increased.  Left ventricular ejection fraction, by visual estimation, is 55 to 60%.   Spectral Doppler shows pseudonormal pattern of left ventricular   myocardial filling (Grade II diastolic dysfunction).  Right Ventricle: Normal right ventricular size and function.  Left Atrium: The left atrium is normal in size.  Right Atrium: The right atrium is normal in size.  Pericardium: There is no evidence of pericardial effusion.  Mitral Valve: Thickening of the anterior mitral valve leaflet. Trace   mitral valve regurgitation is seen.  Tricuspid Valve: Structurally normal tricuspid valve, with normal leaflet   excursion. Trivial tricuspid regurgitation is visualized.  Aortic Valve: Normal trileaflet aortic valve with normal opening. Peak   transaortic gradient equals 8.5 mmHg, mean transaortic gradient equals   4.2 mmHg, the calculated aortic valve area equals 2.81 cm² by the   continuity equation consistent with normally opening aortic valve.  Pulmonic Valve: The pulmonic valve was not well visualized.  Aorta: The aortic root is normal in size and structure.       Summary:   1. Left ventricular ejection fraction, by visual estimation, is 55 to   60%.   2. Mildly increased LV wall thickness.   3. Normal left ventricular internal cavity size.   4. Spectral Doppler shows pseudonormal pattern of left ventricular   myocardial filling (Grade II diastolic dysfunction).   5. There is mild left ventricular hypertrophy.   6. Normal right ventricular size and function.   7. The left atrium is normal in size.   8. The right atrium is normal in size.   9. There is no evidence of pericardial effusion.  10. Thickening of the anterior mitral valve leaflet.  11. Trace mitral valve regurgitation.  12. Structurally normal tricuspid valve, with normal leaflet excursion.  13. Normal trileaflet aortic valve with normal opening.        RADIOLOGY & ADDITIONAL STUDIES:  < from: Xray Chest 1 View- PORTABLE-Urgent (05.28.19 @ 17:24) >    EXAM:  XR CHEST PORTABLE URGENT 1V                          PROCEDURE DATE:  05/28/2019      INTERPRETATION:  CLINICAL STATEMENT: Chest Pain.  Check for lung infiltration area    TECHNIQUE: AP view of the chest.    COMPARISON: None.     The cardiomediastinal silhouette is enlarged but the ananya are not   enlarged. The trachea is midline. There is no focal infiltrate or pleural   effusion. The osseous structures are intact.    IMPRESSION:    Cardiomegaly.  No parenchymal infiltration.

## 2019-05-31 NOTE — H&P ADULT - PROBLEM SELECTOR PLAN 9
-Lovenox SQ for DVT PPx awaiting PPM Monday. But hold 24 hours prior to PPM and hold after PPM placement.

## 2019-05-31 NOTE — H&P ADULT - NSICDXFAMILYHX_GEN_ALL_CORE_FT
FAMILY HISTORY:  Father  Still living? Unknown  Family history of acute myocardial infarction, Age at diagnosis: Age Unknown    Uncle  Still living? Unknown  Family history of acute myocardial infarction, Age at diagnosis: Age Unknown

## 2019-05-31 NOTE — PROGRESS NOTE ADULT - REASON FOR ADMISSION
lightheadedness, Syncope

## 2019-05-31 NOTE — H&P ADULT - NSHPPHYSICALEXAM_GEN_ALL_CORE
PHYSICAL EXAM:  Vital Signs Last 24 Hrs  T(C): 37.1 (05-31-19 @ 11:20)  T(F): 98.7 (05-31-19 @ 11:20), Max: 98.7 (05-31-19 @ 11:20)  HR: 82 (05-31-19 @ 14:55) (51 - 82)  BP: 129/84 (05-31-19 @ 11:20)  BP(mean): 99 (05-31-19 @ 11:17) (99 - 99)  RR: 17 (05-31-19 @ 11:20) (17 - 17)  SpO2: 99% (05-31-19 @ 14:55) (95% - 99%)  Wt(kg): --    05-31 @ 07:01  -  05-31 @ 16:33  --------------------------------------------------------  IN: 0 mL / OUT: 0 mL / NET: 0 mL      Constitutional: NAD, awake and alert  EYES: EOMI  ENT:  Normal Hearing   Neck: Soft and supple  Respiratory: Clear but distant BS, No wheezing, rales or rhonchi  Cardiovascular: S1S2 normal, regular rate and rhythm, no Murmurs, gallops or rubs  Gastrointestinal: Bowel Sounds present, obese, soft, nontender, nondistended, no guarding, no rebound  Extremities: Trace to 1+ bilateral LE pitting and non-pitting edema (R>L). Hyperpigmentation of RLE. Non-tender LE's.   Neurological: AAO x 3, no focal deficits  Skin: RLE hyperpigmentation.   Psych: No depression or anhedonia  Heme: No bruises, no nose bleeds

## 2019-05-31 NOTE — H&P ADULT - PROBLEM SELECTOR PLAN 1
-Transferred from OSH for symptomatic (presyncope/syncope) heart block.   -On telemetry having 2nd degree Type 1 HB and episodes of ?AV dissociation/3rd degree HB.   -C/w telemetry.   -Pacer pads at bedside.   -NPO Sunday at MN for PPM placement on Monday. Patient needs to have it with anesthesia given obesity/HAYLEE.  -F/u admission EKG and CXR.  -Monitor lytes and replete PRN.  -F/u EPS recs.

## 2019-05-31 NOTE — PROGRESS NOTE ADULT - SUBJECTIVE AND OBJECTIVE BOX
Patient is a 59y old  Male who presents with a chief complaint of lightheadedness, Syncope (30 May 2019 14:40)      INTERVAL HPI/OVERNIGHT EVENTS: no events     MEDICATIONS  (STANDING):  furosemide    Tablet 40 milliGRAM(s) Oral daily  heparin  Injectable 5000 Unit(s) SubCutaneous every 8 hours  hydrochlorothiazide 25 milliGRAM(s) Oral daily  lisinopril 10 milliGRAM(s) Oral daily  pantoprazole    Tablet 40 milliGRAM(s) Oral before breakfast    MEDICATIONS  (PRN):  aluminum hydroxide/magnesium hydroxide/simethicone Suspension 30 milliLiter(s) Oral every 4 hours PRN Dyspepsia      Allergies    No Known Allergies    Intolerances        REVIEW OF SYSTEMS:  CONSTITUTIONAL: No fever, weight loss, or fatigue  EYES: No eye pain, visual disturbances, or discharge  ENMT:  No difficulty hearing, tinnitus, vertigo; No sinus or throat pain  NECK: No pain or stiffness  BREASTS: No pain, masses, or nipple discharge  RESPIRATORY: No cough, wheezing, chills or hemoptysis; No shortness of breath  CARDIOVASCULAR: No chest pain, palpitations, dizziness, or leg swelling  GASTROINTESTINAL: No abdominal or epigastric pain. No nausea, vomiting, or hematemesis; No diarrhea or constipation. No melena or hematochezia.  GENITOURINARY: No dysuria, frequency, hematuria, or incontinence  NEUROLOGICAL: No headaches, memory loss, loss of strength, numbness, or tremors  SKIN: No itching, burning, rashes, or lesions   LYMPH NODES: No enlarged glands  ENDOCRINE: No heat or cold intolerance; No hair loss  MUSCULOSKELETAL: No joint pain or swelling; No muscle, back, or extremity pain  PSYCHIATRIC: No depression, anxiety, mood swings, or difficulty sleeping  HEME/LYMPH: No easy bruising, or bleeding gums  ALLERGY AND IMMUNOLOGIC: No hives or eczema    Vital Signs Last 24 Hrs  T(C): 36.4 (31 May 2019 05:24), Max: 36.8 (30 May 2019 23:51)  T(F): 97.5 (31 May 2019 05:24), Max: 98.2 (30 May 2019 23:51)  HR: 94 (31 May 2019 08:57) (53 - 94)  BP: 131/67 (31 May 2019 05:24) (93/60 - 145/76)  BP(mean): --  RR: 17 (31 May 2019 05:24) (17 - 18)  SpO2: 93% (31 May 2019 08:57) (91% - 99%)    PHYSICAL EXAM:  GENERAL: NAD, well-groomed, well-developed  HEAD:  Atraumatic, Normocephalic  EYES: EOMI, PERRLA, conjunctiva and sclera clear  ENMT: No tonsillar erythema, exudates, or enlargement; Moist mucous membranes, Good dentition, No lesions  NECK: Supple, No JVD, Normal thyroid  NERVOUS SYSTEM:  Alert & Oriented X3, Good concentration; Motor Strength 5/5 B/L upper and lower extremities; DTRs 2+ intact and symmetric  CHEST/LUNG: Clear to percussion bilaterally; No rales, rhonchi, wheezing, or rubs  HEART: Regular rate and rhythm; No murmurs, rubs, or gallops  ABDOMEN: Soft, Nontender, Nondistended; Bowel sounds present  EXTREMITIES:  2+ Peripheral Pulses, No clubbing, cyanosis, or edema  LYMPH: No lymphadenopathy noted  SKIN: No rashes or lesions    LABS:    05-30    138  |  97  |  16  ----------------------------<  154<H>  4.0   |  35<H>  |  1.23    Ca    9.1      30 May 2019 14:46  Phos  4.1     05-30  Mg     2.3     05-30          CAPILLARY BLOOD GLUCOSE          RADIOLOGY & ADDITIONAL TESTS:    Imaging Personally Reviewed:  [ X] YES  [ ] NO    Consultant(s) Notes Reviewed:  [ X] YES  [ ] NO    Care Discussed with Consultants/Other Providers [X ] YES  [ ] NO

## 2019-05-31 NOTE — H&P ADULT - PROBLEM SELECTOR PLAN 2
-Outside echo with normal systolic LV function but grade 2 diastolic dysfunction.   -C/w lasix 40mg PO daily.   -Chronic LE edema (R>L) with chronic RLE hyperpigmentation, per patient/wife. -Prior US duplexes of LE's negative for DVT.   -LE elevation.

## 2019-05-31 NOTE — H&P ADULT - ATTENDING COMMENTS
Graham Banda MD  Division of [facility] Medicine  Cell: [phone]  Pager: [phone]  Office: [phone]

## 2019-05-31 NOTE — H&P CARDIOLOGY - PMH
Diabetes mellitus    Heart block, AV    HLD (hyperlipidemia)    Hypercarbia    Hypertension    Obese    HAYLEE treated with BiPAP Diabetes mellitus    Glaucoma    Heart block, AV    HLD (hyperlipidemia)    Hypercarbia    Hypertension    Obese    HAYLEE treated with BiPAP Diabetes mellitus    GERD (gastroesophageal reflux disease)    Glaucoma    Heart block, AV    HLD (hyperlipidemia)    Hydrocele    Hypercarbia    Hypertension    Iron deficiency anemia    Obese    HAYLEE treated with BiPAP    Retinal detachment

## 2019-05-31 NOTE — H&P ADULT - ASSESSMENT
58 year old , obese male, with PMH of ETOH user (last drink 10 days ago as per patient), HTN, HLD, DM-2 (last A1c on 5/29 was 7.0, well managed w/o complications as per patient, on Metformin, endo Maddi Carballo), glaucoma, HAYLEE?, GERD, b/l hydrocele, Rt retinal detachment, iron def anemia. FH significant for premature CAD (father and uncle both had MI in their 50's). Patient presented to [facility] ED on 5/28 with complaint of several episodes of lightheadedness and 1 episode of Syncope earlier that day.  He states that he has a history of Heart block and was being considered for PPM. He denies chest pain, palpitations, SOB, weakness, numbness, slurred speech, facial drooping, blurred vision.  Pt also thinks he has sleep apnea as he is very tired constantly and wakes up frequently at night. At [facility] ED w/ SOB and was using BiPAP intermittently. In the ED, pt's EKG on 5/28 showed Mobitz 1, cardiac enzymes normal, CXR read as clear lungs. At [facility] was consulted by both cards and pulmonary (on nightly Bipap and may need home O2 as per their note). 5/31 transferred to [facility] for pacemaker implant as pt is in second degree AVB. EP team aware of pt's arrival. As per Dr. Neal PPM rescheduled for Monday, please make sure pt is NPO after MN on Sunday, DC planning for Tuesday if no complications post procedure.

## 2019-05-31 NOTE — PROGRESS NOTE ADULT - PROBLEM SELECTOR PLAN 1
Likely cardiac in etiology  cw  telemetry  Cardiology consult    now w heart block on tele, per cardio needs ppm eval.   Awaiting transfer for PPM placement
Likely cardiac in etiology  cw  telemetry  Cardiology consult
Likely cardiac in etiology  cw  telemetry  Cardiology consult    now w heart block on tele, per cardio needs ppm eval. To discuss further with pt and wife

## 2019-06-01 ENCOUNTER — OUTPATIENT (OUTPATIENT)
Dept: OUTPATIENT SERVICES | Facility: HOSPITAL | Age: 59
LOS: 1 days | End: 2019-06-01
Payer: MEDICAID

## 2019-06-01 DIAGNOSIS — Z98.84 BARIATRIC SURGERY STATUS: Chronic | ICD-10-CM

## 2019-06-01 LAB
ALBUMIN SERPL ELPH-MCNC: 3.2 G/DL — LOW (ref 3.3–5)
ALP SERPL-CCNC: 71 U/L — SIGNIFICANT CHANGE UP (ref 40–120)
ALT FLD-CCNC: 21 U/L — SIGNIFICANT CHANGE UP (ref 10–45)
ANION GAP SERPL CALC-SCNC: 10 MMOL/L — SIGNIFICANT CHANGE UP (ref 5–17)
AST SERPL-CCNC: 23 U/L — SIGNIFICANT CHANGE UP (ref 10–40)
BILIRUB SERPL-MCNC: 0.5 MG/DL — SIGNIFICANT CHANGE UP (ref 0.2–1.2)
BUN SERPL-MCNC: 19 MG/DL — SIGNIFICANT CHANGE UP (ref 7–23)
CALCIUM SERPL-MCNC: 9 MG/DL — SIGNIFICANT CHANGE UP (ref 8.4–10.5)
CHLORIDE SERPL-SCNC: 92 MMOL/L — LOW (ref 96–108)
CO2 SERPL-SCNC: 32 MMOL/L — HIGH (ref 22–31)
CREAT SERPL-MCNC: 1.1 MG/DL — SIGNIFICANT CHANGE UP (ref 0.5–1.3)
GLUCOSE BLDC GLUCOMTR-MCNC: 105 MG/DL — HIGH (ref 70–99)
GLUCOSE BLDC GLUCOMTR-MCNC: 109 MG/DL — HIGH (ref 70–99)
GLUCOSE BLDC GLUCOMTR-MCNC: 118 MG/DL — HIGH (ref 70–99)
GLUCOSE BLDC GLUCOMTR-MCNC: 165 MG/DL — HIGH (ref 70–99)
GLUCOSE SERPL-MCNC: 111 MG/DL — HIGH (ref 70–99)
HCT VFR BLD CALC: 46.1 % — SIGNIFICANT CHANGE UP (ref 39–50)
HCV AB S/CO SERPL IA: 0.2 S/CO — SIGNIFICANT CHANGE UP (ref 0–0.99)
HCV AB SERPL-IMP: SIGNIFICANT CHANGE UP
HGB BLD-MCNC: 14.8 G/DL — SIGNIFICANT CHANGE UP (ref 13–17)
MAGNESIUM SERPL-MCNC: 2.2 MG/DL — SIGNIFICANT CHANGE UP (ref 1.6–2.6)
MCHC RBC-ENTMCNC: 28.9 PG — SIGNIFICANT CHANGE UP (ref 27–34)
MCHC RBC-ENTMCNC: 32.1 GM/DL — SIGNIFICANT CHANGE UP (ref 32–36)
MCV RBC AUTO: 90.3 FL — SIGNIFICANT CHANGE UP (ref 80–100)
PHOSPHATE SERPL-MCNC: 4.8 MG/DL — HIGH (ref 2.5–4.5)
PLATELET # BLD AUTO: 116 K/UL — LOW (ref 150–400)
POTASSIUM SERPL-MCNC: 3.6 MMOL/L — SIGNIFICANT CHANGE UP (ref 3.5–5.3)
POTASSIUM SERPL-SCNC: 3.6 MMOL/L — SIGNIFICANT CHANGE UP (ref 3.5–5.3)
PROT SERPL-MCNC: 7.1 G/DL — SIGNIFICANT CHANGE UP (ref 6–8.3)
RBC # BLD: 5.11 M/UL — SIGNIFICANT CHANGE UP (ref 4.2–5.8)
RBC # FLD: 14.2 % — SIGNIFICANT CHANGE UP (ref 10.3–14.5)
SODIUM SERPL-SCNC: 134 MMOL/L — LOW (ref 135–145)
WBC # BLD: 8.3 K/UL — SIGNIFICANT CHANGE UP (ref 3.8–10.5)
WBC # FLD AUTO: 8.3 K/UL — SIGNIFICANT CHANGE UP (ref 3.8–10.5)

## 2019-06-01 PROCEDURE — 99233 SBSQ HOSP IP/OBS HIGH 50: CPT

## 2019-06-01 PROCEDURE — 99232 SBSQ HOSP IP/OBS MODERATE 35: CPT

## 2019-06-01 PROCEDURE — G9001: CPT

## 2019-06-01 RX ADMIN — LISINOPRIL 10 MILLIGRAM(S): 2.5 TABLET ORAL at 06:34

## 2019-06-01 RX ADMIN — ATORVASTATIN CALCIUM 20 MILLIGRAM(S): 80 TABLET, FILM COATED ORAL at 22:04

## 2019-06-01 RX ADMIN — ENOXAPARIN SODIUM 40 MILLIGRAM(S): 100 INJECTION SUBCUTANEOUS at 17:08

## 2019-06-01 RX ADMIN — Medication 25 MILLIGRAM(S): at 06:34

## 2019-06-01 RX ADMIN — PANTOPRAZOLE SODIUM 40 MILLIGRAM(S): 20 TABLET, DELAYED RELEASE ORAL at 06:34

## 2019-06-01 RX ADMIN — DORZOLAMIDE HYDROCHLORIDE TIMOLOL MALEATE 1 DROP(S): 20; 5 SOLUTION/ DROPS OPHTHALMIC at 06:34

## 2019-06-01 RX ADMIN — Medication 40 MILLIGRAM(S): at 06:33

## 2019-06-01 RX ADMIN — DORZOLAMIDE HYDROCHLORIDE TIMOLOL MALEATE 1 DROP(S): 20; 5 SOLUTION/ DROPS OPHTHALMIC at 17:09

## 2019-06-01 NOTE — PROGRESS NOTE ADULT - SUBJECTIVE AND OBJECTIVE BOX
24H hour events:   Sitting up in bed, "Im good"    MEDICATIONS:  enoxaparin Injectable 40 milliGRAM(s) SubCutaneous every 24 hours  furosemide    Tablet 40 milliGRAM(s) Oral daily  hydrochlorothiazide 25 milliGRAM(s) Oral daily  lisinopril 10 milliGRAM(s) Oral daily  acetaminophen   Tablet .. 650 milliGRAM(s) Oral every 6 hours PRN  pantoprazole    Tablet 40 milliGRAM(s) Oral before breakfast  atorvastatin 20 milliGRAM(s) Oral at bedtime  dextrose 40% Gel 15 Gram(s) Oral once PRN  dextrose 50% Injectable 12.5 Gram(s) IV Push once  dextrose 50% Injectable 25 Gram(s) IV Push once  dextrose 50% Injectable 25 Gram(s) IV Push once  glucagon  Injectable 1 milliGRAM(s) IntraMuscular once PRN  insulin lispro (HumaLOG) corrective regimen sliding scale   SubCutaneous three times a day before meals  insulin lispro (HumaLOG) corrective regimen sliding scale   SubCutaneous at bedtime  dextrose 5%. 1000 milliLiter(s) IV Continuous <Continuous>  dorzolamide 2%/timolol 0.5% Ophthalmic Solution 1 Drop(s) Both EYES two times a day      REVIEW OF SYSTEMS:  Constitutional: no fever or chills  Neuro: denies dizziness or lightheadedness  Respiratory: denies sob or cough or wheezing  Cardiac: denies chest pain or palpitations  GI: denies N/V  : denies dysuria      PHYSICAL EXAM:  T(C): 36.7 (06-01-19 @ 13:36), Max: 36.7 (05-31-19 @ 21:17)  HR: 83 (06-01-19 @ 13:36) (60 - 83)  BP: 116/81 (06-01-19 @ 13:36) (115/67 - 135/77)  RR: 17 (06-01-19 @ 13:36) (17 - 17)  SpO2: 98% (06-01-19 @ 13:36) (96% - 100%)  Wt(kg): --  I&O's Summary    31 May 2019 07:01  -  01 Jun 2019 07:00  --------------------------------------------------------  IN: 240 mL / OUT: 1000 mL / NET: -760 mL        Appearance: obese male, in NAD  HEENT: Normocephalic, atraumatic, normal oral mucosa	  Cardiovascular:  S1 S2, No JVD, No murmurs  Respiratory: Lungs clear to auscultation	  Psychiatry: A & O x 3, Mood & affect appropriate  Gastrointestinal:  Soft, Non-tender, + BS	  Skin: No rashes, No ecchymoses, No cyanosis	  Extremities: Normal range of motion, No clubbing, (+) B/L LE edema      LABS:	 	    CBC Full  -  ( 01 Jun 2019 05:20 )  WBC Count : 8.3 K/uL  Hemoglobin : 14.8 g/dL  Hematocrit : 46.1 %  Platelet Count - Automated : 116 K/uL  Mean Cell Volume : 90.3 fl  Mean Cell Hemoglobin : 28.9 pg  Mean Cell Hemoglobin Concentration : 32.1 gm/dL  Auto Neutrophil # : x  Auto Lymphocyte # : x  Auto Monocyte # : x  Auto Eosinophil # : x  Auto Basophil # : x  Auto Neutrophil % : x  Auto Lymphocyte % : x  Auto Monocyte % : x  Auto Eosinophil % : x  Auto Basophil % : x    06-01    134<L>  |  92<L>  |  19  ----------------------------<  111<H>  3.6   |  32<H>  |  1.10    Ca    9.0      01 Jun 2019 05:20  Phos  4.8     06-01  Mg     2.2     06-01    TPro  7.1  /  Alb  3.2<L>  /  TBili  0.5  /  DBili  x   /  AST  23  /  ALT  21  /  AlkPhos  71  06-01      TELEMETRY: NSR/1st Degree AVB/ intermittent Wenckebach 40-70's

## 2019-06-01 NOTE — DIETITIAN INITIAL EVALUATION ADULT. - NS AS NUTRI INTERV ED CONTENT
Educated pt on consistent carbohydrate diet, portion sizing including benefit of mixed meals, "my plate" model, label reading and strategies to promote improved glucose control. Recommended pt to increase consumption of whole grains, consume protein and fiber with CHO, avoid concentrated sweets. Discussed healthier snack and meal ideas. Discussed heart healthy wt management diet; identified foods high in sodium/fats/calories and limiting their intakes, increased consumption of lean meats, fruit and vegetables, healthy fats and oils, cooking tips, and healthy snacking options. Wt loss tips discussed and recommended pt to avoid empty calories from fast foods/sugary snacks and beverages, EtOH usage. Written diet education material provided.

## 2019-06-01 NOTE — PROGRESS NOTE ADULT - SUBJECTIVE AND OBJECTIVE BOX
Thai Hill M.D. Pager Number [phone]    Patient is a 59y old  Male who presents with a chief complaint of Transferred from OSH for [facility] (31 May 2019 14:25)      SUBJECTIVE / OVERNIGHT EVENTS:  Pt seen and examined at bedside. No acute events overnight.  Pt denies cp, palpitations, sob, abd pain, N/V, fever, chills.    ROS:  All other review of systems negative    Allergies    No Known Allergies    Intolerances        MEDICATIONS  (STANDING):  atorvastatin 20 milliGRAM(s) Oral at bedtime  dextrose 5%. 1000 milliLiter(s) (50 mL/Hr) IV Continuous <Continuous>  dextrose 50% Injectable 12.5 Gram(s) IV Push once  dextrose 50% Injectable 25 Gram(s) IV Push once  dextrose 50% Injectable 25 Gram(s) IV Push once  dorzolamide 2%/timolol 0.5% Ophthalmic Solution 1 Drop(s) Both EYES two times a day  enoxaparin Injectable 40 milliGRAM(s) SubCutaneous every 24 hours  furosemide    Tablet 40 milliGRAM(s) Oral daily  hydrochlorothiazide 25 milliGRAM(s) Oral daily  insulin lispro (HumaLOG) corrective regimen sliding scale   SubCutaneous three times a day before meals  insulin lispro (HumaLOG) corrective regimen sliding scale   SubCutaneous at bedtime  lisinopril 10 milliGRAM(s) Oral daily  pantoprazole    Tablet 40 milliGRAM(s) Oral before breakfast    MEDICATIONS  (PRN):  acetaminophen   Tablet .. 650 milliGRAM(s) Oral every 6 hours PRN Mild Pain (1 - 3), Moderate Pain (4 - 6)  dextrose 40% Gel 15 Gram(s) Oral once PRN Blood Glucose LESS THAN 70 milliGRAM(s)/deciliter  glucagon  Injectable 1 milliGRAM(s) IntraMuscular once PRN Glucose LESS THAN 70 milligrams/deciliter      Vital Signs Last 24 Hrs  T(C): 36.5 (01 Jun 2019 04:32), Max: 36.7 (31 May 2019 21:17)  T(F): 97.7 (01 Jun 2019 04:32), Max: 98 (31 May 2019 21:17)  HR: 71 (01 Jun 2019 10:05) (60 - 82)  BP: 116/78 (01 Jun 2019 06:29) (115/67 - 135/77)  BP(mean): --  RR: 17 (01 Jun 2019 04:32) (17 - 17)  SpO2: 97% (01 Jun 2019 10:05) (96% - 100%)  CAPILLARY BLOOD GLUCOSE      POCT Blood Glucose.: 118 mg/dL (01 Jun 2019 11:22)  POCT Blood Glucose.: 109 mg/dL (01 Jun 2019 07:26)  POCT Blood Glucose.: 152 mg/dL (31 May 2019 21:41)  POCT Blood Glucose.: 104 mg/dL (31 May 2019 17:20)  POCT Blood Glucose.: 118 mg/dL (31 May 2019 13:30)    I&O's Summary    31 May 2019 07:01  -  01 Jun 2019 07:00  --------------------------------------------------------  IN: 240 mL / OUT: 1000 mL / NET: -760 mL        PHYSICAL EXAM:  GENERAL: NAD, obese  HEAD:  Atraumatic, Normocephalic  EYES: EOMI, PERRLA, conjunctiva and sclera clear  NECK: Supple, No JVD  CHEST/LUNG: Clear to auscultation bilaterally; No wheeze  HEART: Regular rate and rhythm; No murmurs, rubs, or gallops  ABDOMEN: Soft, Nontender, Nondistended; Bowel sounds present  EXTREMITIES:  2+ Peripheral Pulses, No clubbing, cyanosis, or edema  NEUROLOGY: AAOx3, non-focal  PSYCH: calm  SKIN: RLE hyperpigmentation    LABS:                        14.8   8.3   )-----------( 116      ( 01 Jun 2019 05:20 )             46.1     06-01    134<L>  |  92<L>  |  19  ----------------------------<  111<H>  3.6   |  32<H>  |  1.10    Ca    9.0      01 Jun 2019 05:20  Phos  4.8     06-01  Mg     2.2     06-01    TPro  7.1  /  Alb  3.2<L>  /  TBili  0.5  /  DBili  x   /  AST  23  /  ALT  21  /  AlkPhos  71  06-01              RADIOLOGY & ADDITIONAL TESTS:    Imaging Personally Reviewed:    Consultant(s) Notes Reviewed:      Care Discussed with Consultants/Other Providers:    Case Discussed with Family:    Goals of Care:

## 2019-06-01 NOTE — DIETITIAN INITIAL EVALUATION ADULT. - NS AS NUTRI INTERV MEALS SNACK
Recommend to continue with Consistent Carbohydrate DASH diet. Reviewed alternate menu options and menu ordering procedure. Provide food preferences within therapeutic diet when requested.

## 2019-06-01 NOTE — DIETITIAN INITIAL EVALUATION ADULT. - ORAL INTAKE PTA
Pt reports good appetite and po intakes PTA. Pt's wife usually takes care of all the cooking at home./good

## 2019-06-01 NOTE — DIETITIAN INITIAL EVALUATION ADULT. - OTHER INFO
Nutrition consult received for education and bariatric surgery. Pt reports he had gastric bypass surgery 10 years ago, had wt loss from 380 pounds down to 280 pounds, but gained all the wt back past 4-5 years. Pt's current wt is 343 pounds. Pt with good appetite and > 75% po intakes of meals. No GI distress, +BM yesterday. Pt denies chewing/swallowing difficulties. NKFA. PTA takes multivitamin.

## 2019-06-01 NOTE — DIETITIAN INITIAL EVALUATION ADULT. - ENERGY NEEDS
Height: 68 inches, Weight: 343 pounds  BMI: 52 kg/m2 IBW: 154 pounds (+/-10%), %IBW: 223%  Pertinent Info: Per chart, 58 y/o male with PMH of obesity, T2DM on Metformin, HTN, HLD, EtOH usage, admitted with AV heart block, plan for PPM placement on Monday. +2 bilateral feet/ankles/legs edema, no pressure ulcers noted at this time.

## 2019-06-01 NOTE — DIETITIAN INITIAL EVALUATION ADULT. - PERTINENT MEDS FT
MEDICATIONS  (STANDING):  atorvastatin 20 milliGRAM(s) Oral at bedtime  dextrose 5%. 1000 milliLiter(s) (50 mL/Hr) IV Continuous <Continuous>  dorzolamide 2%/timolol 0.5% Ophthalmic Solution 1 Drop(s) Both EYES two times a day  enoxaparin Injectable 40 milliGRAM(s) SubCutaneous every 24 hours  furosemide    Tablet 40 milliGRAM(s) Oral daily  hydrochlorothiazide 25 milliGRAM(s) Oral daily  insulin lispro (HumaLOG) corrective regimen sliding scale   SubCutaneous three times a day before meals  insulin lispro (HumaLOG) corrective regimen sliding scale   SubCutaneous at bedtime  lisinopril 10 milliGRAM(s) Oral daily  pantoprazole    Tablet 40 milliGRAM(s) Oral before breakfast

## 2019-06-01 NOTE — PROGRESS NOTE ADULT - ASSESSMENT
58 year old , obese male, with PMH of ETOH user (last drink 10 days ago as per patient), HTN, HLD, DM-2 (last A1c on 5/29 was 7.0, well managed w/o complications as per patient, on Metformin, endo Maddi Carballo), glaucoma, HAYLEE?, GERD, b/l hydrocele, Rt retinal detachment, iron def anemia. FH significant for premature CAD (father and uncle both had MI in their 50's). Patient presented to [facility] ED on 5/28 with complaint of several episodes of lightheadedness and 1 episode of Syncope earlier that day.  He states that he has a history of Heart block and was being considered for PPM. He denies chest pain, palpitations, SOB, weakness, numbness, slurred speech, facial drooping, blurred vision.  Pt also thinks he has sleep apnea as he is very tired constantly and wakes up frequently at night. At [facility] ED w/ SOB and was using BiPAP intermittently. In the ED, pt's EKG on 5/28 showed Mobitz 1, cardiac enzymes normal, CXR read as clear lungs. At [facility] was consulted by both cards and pulmonary (on nightly Bipap and may need home O2 as per their note). 5/31 transferred to [facility] for pacemaker implant as pt is in second degree AVB. EP team aware of pt's arrival. As per Dr. Neal PPM rescheduled for Monday, please make sure pt is NPO after MN on Sunday, DC planning for Tuesday if no complications post procedure.

## 2019-06-01 NOTE — PROGRESS NOTE ADULT - PROBLEM SELECTOR PLAN 1
-Transferred from OSH for symptomatic (presyncope/syncope) heart block.   -On telemetry having 2nd degree Type 1 HB and episodes of ?AV dissociation/3rd degree HB.   -C/w telemetry.   -Pacer pads at bedside.   -NPO Sunday at MN for PPM placement on Monday. Patient needs to have it with anesthesia given obesity/HAYLEE.

## 2019-06-01 NOTE — DIETITIAN INITIAL EVALUATION ADULT. - ADHERENCE
poor/Pt reports he eats healthy such as chicken austin and vegetables when he eats at home. However, pt does frequently consume fast food burgers/fries, pizza, snacks, sodas on regular basis. Pt recently started on Metformin (less than a month) 2/2 being told he had 'prediabetes', noted HgbA1C per chart is 7% (5/29)

## 2019-06-01 NOTE — PROGRESS NOTE ADULT - ASSESSMENT
57 y/o [place], obese male, ETOH user ( last drink 10 days ago as per patient), with PMH HTN, HLD, DM type 2 ( last A1c on 5/29 was 7.0, well managed w/o complications as per patient, on Metformin, endo Maddi Mcarthur), glaucoma, HAYLEE?, GERD, bl Hydrocele, R retinal detachment, iron def anemia.  FH significant for premature CAD ( father and uncle both had MI in their 50's).  Patient  presented to [facility]  ED on 5/28 with complaint of several episodes of lightheadedness and 1 episode of Syncope earlier that day.  He states that he has a history of Heart block and was being considered for PPM.  Pt also thinks he has sleep apnea as he is very tired constantly and wakes up frequently at night.  At [facility] ED w/ SOB and was using BiPAP intermittently.  In the ED, pt's EKG on 5/28 showed Mobitz 1, cardiac enzymes normal, CXR read as clear lungs. At [facility] was consulted by both cards and pulmonary ( on nightly Bipap and may need home O2 as per their note).  Today transferred to [facility] for pacemaker implant as pt is in second degree AVB.        #Livan  - Continue telemetry monitoring  - Keep R2 pads on patient   - Plan for PPM implant with anesthesia on Monday, procedure d/w patient and son  - T & S active from 5/31/19  - Hold Lovenox monday morning  - Continue current medications including Furosemide, Lisinopril, HCTZ      #[id number] 57 y/o [place], obese male, ETOH user ( last drink 10 days ago as per patient), with PMH HTN, HLD, DM type 2 ( last A1c on 5/29 was 7.0, well managed w/o complications as per patient, on Metformin, endo Maddi Mcarthur), glaucoma, HAYLEE?, GERD, bl Hydrocele, R retinal detachment, iron def anemia.  FH significant for premature CAD ( father and uncle both had MI in their 50's).  Patient  presented to [facility]  ED on 5/28 with complaint of several episodes of lightheadedness and 1 episode of Syncope earlier that day.  He states that he has a history of Heart block and was being considered for PPM.  Pt also thinks he has sleep apnea as he is very tired constantly and wakes up frequently at night.  At [facility] ED w/ SOB and was using BiPAP intermittently.  In the ED, pt's EKG on 5/28 showed Mobitz 1, cardiac enzymes normal, CXR read as clear lungs. At [facility] was consulted by both cards and pulmonary ( on nightly Bipap and may need home O2 as per their note).  Today transferred to [facility] for pacemaker implant as pt is in second degree AVB.        #Livan  - Continue telemetry monitoring  - Keep R2 pads on patient   - Plan for PPM implant with anesthesia on Monday, procedure d/w patient and son  - T & S active from 5/31/19  - D/C Lovenox sunday night  - Continue current medications including Furosemide, Lisinopril, HCTZ      #[phone]

## 2019-06-02 LAB
ANION GAP SERPL CALC-SCNC: 12 MMOL/L — SIGNIFICANT CHANGE UP (ref 5–17)
BLD GP AB SCN SERPL QL: NEGATIVE — SIGNIFICANT CHANGE UP
BUN SERPL-MCNC: 22 MG/DL — SIGNIFICANT CHANGE UP (ref 7–23)
CALCIUM SERPL-MCNC: 8.9 MG/DL — SIGNIFICANT CHANGE UP (ref 8.4–10.5)
CHLORIDE SERPL-SCNC: 94 MMOL/L — LOW (ref 96–108)
CO2 SERPL-SCNC: 30 MMOL/L — SIGNIFICANT CHANGE UP (ref 22–31)
CREAT SERPL-MCNC: 1.2 MG/DL — SIGNIFICANT CHANGE UP (ref 0.5–1.3)
GLUCOSE BLDC GLUCOMTR-MCNC: 102 MG/DL — HIGH (ref 70–99)
GLUCOSE BLDC GLUCOMTR-MCNC: 106 MG/DL — HIGH (ref 70–99)
GLUCOSE BLDC GLUCOMTR-MCNC: 111 MG/DL — HIGH (ref 70–99)
GLUCOSE BLDC GLUCOMTR-MCNC: 124 MG/DL — HIGH (ref 70–99)
GLUCOSE SERPL-MCNC: 108 MG/DL — HIGH (ref 70–99)
HCT VFR BLD CALC: 47.2 % — SIGNIFICANT CHANGE UP (ref 39–50)
HGB BLD-MCNC: 14.6 G/DL — SIGNIFICANT CHANGE UP (ref 13–17)
MCHC RBC-ENTMCNC: 27.7 PG — SIGNIFICANT CHANGE UP (ref 27–34)
MCHC RBC-ENTMCNC: 30.9 GM/DL — LOW (ref 32–36)
MCV RBC AUTO: 89.4 FL — SIGNIFICANT CHANGE UP (ref 80–100)
PLATELET # BLD AUTO: 127 K/UL — LOW (ref 150–400)
POTASSIUM SERPL-MCNC: 3.5 MMOL/L — SIGNIFICANT CHANGE UP (ref 3.5–5.3)
POTASSIUM SERPL-SCNC: 3.5 MMOL/L — SIGNIFICANT CHANGE UP (ref 3.5–5.3)
RBC # BLD: 5.28 M/UL — SIGNIFICANT CHANGE UP (ref 4.2–5.8)
RBC # FLD: 14.8 % — HIGH (ref 10.3–14.5)
RH IG SCN BLD-IMP: POSITIVE — SIGNIFICANT CHANGE UP
SODIUM SERPL-SCNC: 136 MMOL/L — SIGNIFICANT CHANGE UP (ref 135–145)
WBC # BLD: 8.3 K/UL — SIGNIFICANT CHANGE UP (ref 3.8–10.5)
WBC # FLD AUTO: 8.3 K/UL — SIGNIFICANT CHANGE UP (ref 3.8–10.5)

## 2019-06-02 PROCEDURE — 99233 SBSQ HOSP IP/OBS HIGH 50: CPT

## 2019-06-02 RX ADMIN — LISINOPRIL 10 MILLIGRAM(S): 2.5 TABLET ORAL at 06:01

## 2019-06-02 RX ADMIN — PANTOPRAZOLE SODIUM 40 MILLIGRAM(S): 20 TABLET, DELAYED RELEASE ORAL at 06:01

## 2019-06-02 RX ADMIN — DORZOLAMIDE HYDROCHLORIDE TIMOLOL MALEATE 1 DROP(S): 20; 5 SOLUTION/ DROPS OPHTHALMIC at 06:49

## 2019-06-02 RX ADMIN — DORZOLAMIDE HYDROCHLORIDE TIMOLOL MALEATE 1 DROP(S): 20; 5 SOLUTION/ DROPS OPHTHALMIC at 23:19

## 2019-06-02 RX ADMIN — ATORVASTATIN CALCIUM 20 MILLIGRAM(S): 80 TABLET, FILM COATED ORAL at 21:40

## 2019-06-02 RX ADMIN — Medication 25 MILLIGRAM(S): at 06:01

## 2019-06-02 RX ADMIN — Medication 40 MILLIGRAM(S): at 06:01

## 2019-06-02 NOTE — PROVIDER CONTACT NOTE (OTHER) - ASSESSMENT
Pt is A&Ox4. VSS. tmp 98.3, hr 63, bp 118/78, respirations 18 and oxygen saturation 93%. on room air. Pt was sleeping. Pt is asymptomatic. Pt is having no c/o chest pain, SOB, palpitations, and discomfort. Pt goes back and forth from second degree type 1 and second degree type II

## 2019-06-02 NOTE — PROGRESS NOTE ADULT - ASSESSMENT
58 year old , obese male, with PMH of ETOH user (last drink 10 days ago as per patient), HTN, HLD, DM-2 (last A1c on 5/29 was 7.0, well managed w/o complications as per patient, on Metformin, endo Maddi Carballo), glaucoma, HAYLEE?, GERD, b/l hydrocele, Rt retinal detachment, iron def anemia. FH significant for premature CAD (father and uncle both had MI in their 50's). [facility] with complaint of several episodes of lightheadedness and 1 episode of Syncope earlier that day.  He states that he has a history of Heart block and was being considered for PPM. He denies chest pain, palpitations, SOB, weakness, numbness, slurred speech, facial drooping, blurred vision.  Pt also thinks he has sleep apnea as he is very tired constantly and wakes up frequently at night. [facility] w/ SOB and was using BiPAP intermittently. In the ED, pt's EKG on 5/28 showed Mobitz 1, cardiac enzymes normal, CXR read as clear lungs. [facility] was consulted by both cards and pulmonary (on nightly Bipap and may need home O2 as per their note). 5/31 transferred to [facility] for pacemaker implant as pt is in second degree AVB. EP team aware of pt's arrival. As per Dr. Neal PPM rescheduled for Monday, please make sure pt is NPO after MN on Sunday, DC planning for Tuesday if no complications post procedure.

## 2019-06-02 NOTE — PROVIDER CONTACT NOTE (OTHER) - BACKGROUND
Pt came in with cardiac conduction disorder. Pt has a hx of retinal detachment, iron deficiency anemia, GERD, glaucoma, DM.

## 2019-06-02 NOTE — PROGRESS NOTE ADULT - SUBJECTIVE AND OBJECTIVE BOX
Thai Hill M.D. Pager Number [phone]    Patient is a 59y old  Male who presents with a chief complaint of Transferred from OSH for [facility] (01 Jun 2019 16:21)      SUBJECTIVE / OVERNIGHT EVENTS:  Pt seen and examined at bedside in the presence of pt's wife. No acute events overnight  Pt denies cp, palpitations, sob, abd pain, N/V, fever, chills.     ROS:  All other review of systems negative    Allergies    No Known Allergies    Intolerances        MEDICATIONS  (STANDING):  atorvastatin 20 milliGRAM(s) Oral at bedtime  dextrose 5%. 1000 milliLiter(s) (50 mL/Hr) IV Continuous <Continuous>  dextrose 50% Injectable 12.5 Gram(s) IV Push once  dextrose 50% Injectable 25 Gram(s) IV Push once  dextrose 50% Injectable 25 Gram(s) IV Push once  dorzolamide 2%/timolol 0.5% Ophthalmic Solution 1 Drop(s) Both EYES two times a day  enoxaparin Injectable 40 milliGRAM(s) SubCutaneous every 24 hours  furosemide    Tablet 40 milliGRAM(s) Oral daily  hydrochlorothiazide 25 milliGRAM(s) Oral daily  insulin lispro (HumaLOG) corrective regimen sliding scale   SubCutaneous three times a day before meals  insulin lispro (HumaLOG) corrective regimen sliding scale   SubCutaneous at bedtime  lisinopril 10 milliGRAM(s) Oral daily  pantoprazole    Tablet 40 milliGRAM(s) Oral before breakfast    MEDICATIONS  (PRN):  acetaminophen   Tablet .. 650 milliGRAM(s) Oral every 6 hours PRN Mild Pain (1 - 3), Moderate Pain (4 - 6)  dextrose 40% Gel 15 Gram(s) Oral once PRN Blood Glucose LESS THAN 70 milliGRAM(s)/deciliter  glucagon  Injectable 1 milliGRAM(s) IntraMuscular once PRN Glucose LESS THAN 70 milligrams/deciliter      Vital Signs Last 24 Hrs  T(C): 36.8 (02 Jun 2019 04:23), Max: 36.8 (01 Jun 2019 21:00)  T(F): 98.3 (02 Jun 2019 04:23), Max: 98.3 (01 Jun 2019 21:00)  HR: 52 (02 Jun 2019 11:08) (50 - 83)  BP: 106/67 (02 Jun 2019 04:23) (106/67 - 133/85)  BP(mean): --  RR: 20 (02 Jun 2019 04:23) (17 - 20)  SpO2: 93% (02 Jun 2019 11:08) (92% - 98%)  CAPILLARY BLOOD GLUCOSE      POCT Blood Glucose.: 111 mg/dL (02 Jun 2019 12:01)  POCT Blood Glucose.: 102 mg/dL (02 Jun 2019 08:05)  POCT Blood Glucose.: 165 mg/dL (01 Jun 2019 22:07)  POCT Blood Glucose.: 105 mg/dL (01 Jun 2019 17:08)    I&O's Summary    01 Jun 2019 07:01  -  02 Jun 2019 07:00  --------------------------------------------------------  IN: 480 mL / OUT: 0 mL / NET: 480 mL    02 Jun 2019 07:01  -  02 Jun 2019 12:26  --------------------------------------------------------  IN: 240 mL / OUT: 0 mL / NET: 240 mL        PHYSICAL EXAM:  GENERAL: NAD, obese  CHEST/LUNG: Clear to auscultation bilaterally; No wheeze  HEART: Regular rate and rhythm; No murmurs, rubs, or gallops  ABDOMEN: Soft, Nontender, Nondistended; Bowel sounds present  EXTREMITIES:  2+ Peripheral Pulses, No clubbing, cyanosis, or edema  NEUROLOGY: AAOx3, non-focal  PSYCH: calm  SKIN: RLE hyperpigmentation    LABS:                        14.6   8.30  )-----------( 127      ( 02 Jun 2019 08:49 )             47.2     06-02    136  |  94<L>  |  22  ----------------------------<  108<H>  3.5   |  30  |  1.20    Ca    8.9      02 Jun 2019 05:55  Phos  4.8     06-01  Mg     2.2     06-01    TPro  7.1  /  Alb  3.2<L>  /  TBili  0.5  /  DBili  x   /  AST  23  /  ALT  21  /  AlkPhos  71  06-01              RADIOLOGY & ADDITIONAL TESTS:    Imaging Personally Reviewed:    Consultant(s) Notes Reviewed:      Care Discussed with Consultants/Other Providers:    Case Discussed with Family:    Goals of Care:

## 2019-06-02 NOTE — PROGRESS NOTE ADULT - PROBLEM SELECTOR PLAN 1
-Transferred from OSH for symptomatic (presyncope/syncope) heart block.   -On telemetry having 2nd degree Type 1 HB and episodes of ?AV dissociation/3rd degree HB.   -C/w telemetry.   -Pacer pads at bedside.   [facility] Patient needs to have it with anesthesia given obesity/HAYLEE.

## 2019-06-02 NOTE — PROVIDER CONTACT NOTE (OTHER) - BACKGROUND
Pt came in with a cardiac conduction disorder. Pt has a hx of DM, hydrocele, GERD, DM, heart block, HAYLEE, obese.

## 2019-06-02 NOTE — CHART NOTE - NSCHARTNOTEFT_GEN_A_CORE
Informed by Tele tech and RN, pt now sustaining rhythm in 2nd degree Type 1 lowest HR 50s and brief episode of 2nd degree Type 2 lowest HR 50s. Pt vital signs stable, asymptomatic while in bed. Pt denies any symptoms of lightheadedness, weakness, diaphoresis or CP.  Pt transferred from OSH for episodes beginning on 5/28of several episodes of lightheadedness and 1 episode of Syncope earlier that day, at that time noted with episodes of Mobitz 1 with baseline HR in NSR.  Pt now planned for PPM on Monday, on bedrest with pacer pads attached.  Message left with House Cardiologist Dr. Patrick Dorman of rhythm change and event on tele

## 2019-06-03 DIAGNOSIS — I44.1 ATRIOVENTRICULAR BLOCK, SECOND DEGREE: ICD-10-CM

## 2019-06-03 PROBLEM — E66.9 OBESITY, UNSPECIFIED: Chronic | Status: ACTIVE | Noted: 2019-05-31

## 2019-06-03 PROBLEM — E78.5 HYPERLIPIDEMIA, UNSPECIFIED: Chronic | Status: ACTIVE | Noted: 2019-05-31

## 2019-06-03 PROBLEM — D50.9 IRON DEFICIENCY ANEMIA, UNSPECIFIED: Chronic | Status: ACTIVE | Noted: 2019-05-31

## 2019-06-03 PROBLEM — I44.30 UNSPECIFIED ATRIOVENTRICULAR BLOCK: Chronic | Status: ACTIVE | Noted: 2019-05-31

## 2019-06-03 PROBLEM — G47.33 OBSTRUCTIVE SLEEP APNEA (ADULT) (PEDIATRIC): Chronic | Status: ACTIVE | Noted: 2019-05-31

## 2019-06-03 PROBLEM — K21.9 GASTRO-ESOPHAGEAL REFLUX DISEASE WITHOUT ESOPHAGITIS: Chronic | Status: ACTIVE | Noted: 2019-05-31

## 2019-06-03 PROBLEM — I10 ESSENTIAL (PRIMARY) HYPERTENSION: Chronic | Status: ACTIVE | Noted: 2019-05-31

## 2019-06-03 PROBLEM — R06.89 OTHER ABNORMALITIES OF BREATHING: Chronic | Status: ACTIVE | Noted: 2019-05-31

## 2019-06-03 PROBLEM — E11.9 TYPE 2 DIABETES MELLITUS WITHOUT COMPLICATIONS: Chronic | Status: ACTIVE | Noted: 2019-05-31

## 2019-06-03 PROBLEM — H33.20 SEROUS RETINAL DETACHMENT, UNSPECIFIED EYE: Chronic | Status: ACTIVE | Noted: 2019-05-31

## 2019-06-03 PROBLEM — N43.3 HYDROCELE, UNSPECIFIED: Chronic | Status: ACTIVE | Noted: 2019-05-31

## 2019-06-03 PROBLEM — H40.9 UNSPECIFIED GLAUCOMA: Chronic | Status: ACTIVE | Noted: 2019-05-31

## 2019-06-03 LAB
ANION GAP SERPL CALC-SCNC: 15 MMOL/L — SIGNIFICANT CHANGE UP (ref 5–17)
BUN SERPL-MCNC: 24 MG/DL — HIGH (ref 7–23)
CALCIUM SERPL-MCNC: 8.7 MG/DL — SIGNIFICANT CHANGE UP (ref 8.4–10.5)
CHLORIDE SERPL-SCNC: 93 MMOL/L — LOW (ref 96–108)
CO2 SERPL-SCNC: 28 MMOL/L — SIGNIFICANT CHANGE UP (ref 22–31)
CREAT SERPL-MCNC: 1.16 MG/DL — SIGNIFICANT CHANGE UP (ref 0.5–1.3)
GLUCOSE BLDC GLUCOMTR-MCNC: 105 MG/DL — HIGH (ref 70–99)
GLUCOSE BLDC GLUCOMTR-MCNC: 115 MG/DL — HIGH (ref 70–99)
GLUCOSE BLDC GLUCOMTR-MCNC: 85 MG/DL — SIGNIFICANT CHANGE UP (ref 70–99)
GLUCOSE SERPL-MCNC: 115 MG/DL — HIGH (ref 70–99)
HCT VFR BLD CALC: 46.3 % — SIGNIFICANT CHANGE UP (ref 39–50)
HGB BLD-MCNC: 14.8 G/DL — SIGNIFICANT CHANGE UP (ref 13–17)
MCHC RBC-ENTMCNC: 28.1 PG — SIGNIFICANT CHANGE UP (ref 27–34)
MCHC RBC-ENTMCNC: 32 GM/DL — SIGNIFICANT CHANGE UP (ref 32–36)
MCV RBC AUTO: 87.9 FL — SIGNIFICANT CHANGE UP (ref 80–100)
PLATELET # BLD AUTO: 121 K/UL — LOW (ref 150–400)
POTASSIUM SERPL-MCNC: 3.6 MMOL/L — SIGNIFICANT CHANGE UP (ref 3.5–5.3)
POTASSIUM SERPL-SCNC: 3.6 MMOL/L — SIGNIFICANT CHANGE UP (ref 3.5–5.3)
RBC # BLD: 5.27 M/UL — SIGNIFICANT CHANGE UP (ref 4.2–5.8)
RBC # FLD: 14.6 % — HIGH (ref 10.3–14.5)
SODIUM SERPL-SCNC: 136 MMOL/L — SIGNIFICANT CHANGE UP (ref 135–145)
WBC # BLD: 7.61 K/UL — SIGNIFICANT CHANGE UP (ref 3.8–10.5)
WBC # FLD AUTO: 7.61 K/UL — SIGNIFICANT CHANGE UP (ref 3.8–10.5)

## 2019-06-03 PROCEDURE — 99233 SBSQ HOSP IP/OBS HIGH 50: CPT

## 2019-06-03 PROCEDURE — 93010 ELECTROCARDIOGRAM REPORT: CPT

## 2019-06-03 PROCEDURE — 71045 X-RAY EXAM CHEST 1 VIEW: CPT | Mod: 26

## 2019-06-03 PROCEDURE — 33208 INSRT HEART PM ATRIAL & VENT: CPT

## 2019-06-03 RX ADMIN — DORZOLAMIDE HYDROCHLORIDE TIMOLOL MALEATE 1 DROP(S): 20; 5 SOLUTION/ DROPS OPHTHALMIC at 05:39

## 2019-06-03 RX ADMIN — Medication 40 MILLIGRAM(S): at 05:39

## 2019-06-03 RX ADMIN — Medication 25 MILLIGRAM(S): at 05:39

## 2019-06-03 RX ADMIN — PANTOPRAZOLE SODIUM 40 MILLIGRAM(S): 20 TABLET, DELAYED RELEASE ORAL at 05:39

## 2019-06-03 RX ADMIN — ATORVASTATIN CALCIUM 20 MILLIGRAM(S): 80 TABLET, FILM COATED ORAL at 21:19

## 2019-06-03 RX ADMIN — LISINOPRIL 10 MILLIGRAM(S): 2.5 TABLET ORAL at 05:39

## 2019-06-03 NOTE — PROGRESS NOTE ADULT - SUBJECTIVE AND OBJECTIVE BOX
INTERVAL HPI/OVERNIGHT EVENTS: Patient seen and examined, lying in bed, in no acute distress. He denies chest pain/sob/dizziness/palpitations.     MEDICATIONS  (STANDING):  atorvastatin 20 milliGRAM(s) Oral at bedtime  dextrose 5%. 1000 milliLiter(s) (50 mL/Hr) IV Continuous <Continuous>  dextrose 50% Injectable 12.5 Gram(s) IV Push once  dextrose 50% Injectable 25 Gram(s) IV Push once  dextrose 50% Injectable 25 Gram(s) IV Push once  dorzolamide 2%/timolol 0.5% Ophthalmic Solution 1 Drop(s) Both EYES two times a day  furosemide    Tablet 40 milliGRAM(s) Oral daily  hydrochlorothiazide 25 milliGRAM(s) Oral daily  insulin lispro (HumaLOG) corrective regimen sliding scale   SubCutaneous three times a day before meals  insulin lispro (HumaLOG) corrective regimen sliding scale   SubCutaneous at bedtime  lisinopril 10 milliGRAM(s) Oral daily  pantoprazole    Tablet 40 milliGRAM(s) Oral before breakfast    MEDICATIONS  (PRN):  acetaminophen   Tablet .. 650 milliGRAM(s) Oral every 6 hours PRN Mild Pain (1 - 3), Moderate Pain (4 - 6)  dextrose 40% Gel 15 Gram(s) Oral once PRN Blood Glucose LESS THAN 70 milliGRAM(s)/deciliter  glucagon  Injectable 1 milliGRAM(s) IntraMuscular once PRN Glucose LESS THAN 70 milligrams/deciliter      Allergies    No Known Allergies    Intolerances      ROS:  General: Pt denies fevers/ constitutional discomfort.   Cardiovascular: denies chest pain/palpitations/dizziness.   Respiratory and Thorax: denies SOB  Gastrointestinal: denies abdominal pain/diarrhea/bloody stool  Genitourinary: denies dysuria/ hematuria   Hematologic: denies abnormal bleeding    Vital Signs Last 24 Hrs  T(C): 36.7 (03 Jun 2019 04:24), Max: 36.7 (02 Jun 2019 12:05)  T(F): 98.1 (03 Jun 2019 04:24), Max: 98.1 (03 Jun 2019 04:24)  HR: 50 (03 Jun 2019 06:43) (50 - 74)  BP: 114/69 (03 Jun 2019 04:24) (101/70 - 114/69)  BP(mean): --  RR: 19 (03 Jun 2019 04:24) (17 - 19)  SpO2: 94% (03 Jun 2019 06:43) (91% - 99%)    Physical Exam:  Constitutional: well developed, well nourished and in  no acute distress  Neurological: Alert & Oriented x 3,  no focal deficits  Respiratory: Breathing nonlabored; CTA bilaterally.   Cardiovascular: (+) S1 & S2  Gastrointestinal: softly distended, NT/ no rebound, (+) BS  Extremities: +2 bilateral radial pulses. Trace bilateral pedal edema.   Skin:  + bilateral lower extremity hyperpigmentation. Dry skin.       LABS:                        14.8   7.61  )-----------( 121      ( 03 Jun 2019 08:10 )             46.3     06-03    136  |  93<L>  |  24<H>  ----------------------------<  115<H>  3.6   |  28  |  1.16    Ca    8.7      03 Jun 2019 06:07        Type + Screen (06.02.19 @ 05:21)    ABO Interpretation: A    Rh Interpretation: Positive    Antibody Screen: Negative        RADIOLOGY & ADDITIONAL TESTS:     TELE:    EKG: INTERVAL HPI/OVERNIGHT EVENTS: Patient seen and examined, lying in bed, in no acute distress. He denies chest pain/sob/dizziness/palpitations.     MEDICATIONS  (STANDING):  atorvastatin 20 milliGRAM(s) Oral at bedtime  dextrose 5%. 1000 milliLiter(s) (50 mL/Hr) IV Continuous <Continuous>  dextrose 50% Injectable 12.5 Gram(s) IV Push once  dextrose 50% Injectable 25 Gram(s) IV Push once  dextrose 50% Injectable 25 Gram(s) IV Push once  dorzolamide 2%/timolol 0.5% Ophthalmic Solution 1 Drop(s) Both EYES two times a day  furosemide    Tablet 40 milliGRAM(s) Oral daily  hydrochlorothiazide 25 milliGRAM(s) Oral daily  insulin lispro (HumaLOG) corrective regimen sliding scale   SubCutaneous three times a day before meals  insulin lispro (HumaLOG) corrective regimen sliding scale   SubCutaneous at bedtime  lisinopril 10 milliGRAM(s) Oral daily  pantoprazole    Tablet 40 milliGRAM(s) Oral before breakfast    MEDICATIONS  (PRN):  acetaminophen   Tablet .. 650 milliGRAM(s) Oral every 6 hours PRN Mild Pain (1 - 3), Moderate Pain (4 - 6)  dextrose 40% Gel 15 Gram(s) Oral once PRN Blood Glucose LESS THAN 70 milliGRAM(s)/deciliter  glucagon  Injectable 1 milliGRAM(s) IntraMuscular once PRN Glucose LESS THAN 70 milligrams/deciliter      Allergies    No Known Allergies    Intolerances      ROS:  General: Pt denies fevers/ constitutional discomfort.   Cardiovascular: denies chest pain/palpitations/dizziness.   Respiratory and Thorax: denies SOB  Gastrointestinal: denies abdominal pain/diarrhea/bloody stool  Genitourinary: denies dysuria/ hematuria   Hematologic: denies abnormal bleeding    Vital Signs Last 24 Hrs  T(C): 36.7 (03 Jun 2019 04:24), Max: 36.7 (02 Jun 2019 12:05)  T(F): 98.1 (03 Jun 2019 04:24), Max: 98.1 (03 Jun 2019 04:24)  HR: 50 (03 Jun 2019 06:43) (50 - 74)  BP: 114/69 (03 Jun 2019 04:24) (101/70 - 114/69)  BP(mean): --  RR: 19 (03 Jun 2019 04:24) (17 - 19)  SpO2: 94% (03 Jun 2019 06:43) (91% - 99%)    Physical Exam:  Constitutional: well developed, well nourished and in  no acute distress  Neurological: Alert & Oriented x 3,  no focal deficits  Respiratory: Breathing nonlabored; CTA bilaterally.   Cardiovascular: (+) S1 & S2  Gastrointestinal: softly distended, NT/ no rebound, (+) BS  Extremities: +2 bilateral radial pulses. Trace bilateral pedal edema.   Skin:  + bilateral lower extremity hyperpigmentation. Dry skin.       LABS:                        14.8   7.61  )-----------( 121      ( 03 Jun 2019 08:10 )             46.3     06-03    136  |  93<L>  |  24<H>  ----------------------------<  115<H>  3.6   |  28  |  1.16    Ca    8.7      03 Jun 2019 06:07        Type + Screen (06.02.19 @ 05:21)    ABO Interpretation: A    Rh Interpretation: Positive    Antibody Screen: Negative        RADIOLOGY & ADDITIONAL TESTS:     TELE: Sinus w/ 2:1 AVB and mobitz type 2 INTERVAL HPI/OVERNIGHT EVENTS: Patient seen and examined, lying in bed, in no acute distress. He denies chest pain/sob/dizziness/palpitations.     MEDICATIONS  (STANDING):  atorvastatin 20 milliGRAM(s) Oral at bedtime  dextrose 5%. 1000 milliLiter(s) (50 mL/Hr) IV Continuous <Continuous>  dextrose 50% Injectable 12.5 Gram(s) IV Push once  dextrose 50% Injectable 25 Gram(s) IV Push once  dextrose 50% Injectable 25 Gram(s) IV Push once  dorzolamide 2%/timolol 0.5% Ophthalmic Solution 1 Drop(s) Both EYES two times a day  furosemide    Tablet 40 milliGRAM(s) Oral daily  hydrochlorothiazide 25 milliGRAM(s) Oral daily  insulin lispro (HumaLOG) corrective regimen sliding scale   SubCutaneous three times a day before meals  insulin lispro (HumaLOG) corrective regimen sliding scale   SubCutaneous at bedtime  lisinopril 10 milliGRAM(s) Oral daily  pantoprazole    Tablet 40 milliGRAM(s) Oral before breakfast    MEDICATIONS  (PRN):  acetaminophen   Tablet .. 650 milliGRAM(s) Oral every 6 hours PRN Mild Pain (1 - 3), Moderate Pain (4 - 6)  dextrose 40% Gel 15 Gram(s) Oral once PRN Blood Glucose LESS THAN 70 milliGRAM(s)/deciliter  glucagon  Injectable 1 milliGRAM(s) IntraMuscular once PRN Glucose LESS THAN 70 milligrams/deciliter      Allergies    No Known Allergies    Intolerances      ROS:  General: Pt denies fevers/ constitutional discomfort.   Cardiovascular: denies chest pain/palpitations/dizziness.   Respiratory and Thorax: denies SOB  Gastrointestinal: denies abdominal pain/diarrhea/bloody stool  Genitourinary: denies dysuria/ hematuria   Hematologic: denies abnormal bleeding    Vital Signs Last 24 Hrs  T(C): 36.7 (03 Jun 2019 04:24), Max: 36.7 (02 Jun 2019 12:05)  T(F): 98.1 (03 Jun 2019 04:24), Max: 98.1 (03 Jun 2019 04:24)  HR: 50 (03 Jun 2019 06:43) (50 - 74)  BP: 114/69 (03 Jun 2019 04:24) (101/70 - 114/69)  BP(mean): --  RR: 19 (03 Jun 2019 04:24) (17 - 19)  SpO2: 94% (03 Jun 2019 06:43) (91% - 99%)    Physical Exam:  Constitutional: well developed, well nourished and in  no acute distress  Neurological: Alert & Oriented x 3,  no focal deficits  Respiratory: Breathing nonlabored; CTA bilaterally.   Cardiovascular: (+) S1 & S2  Gastrointestinal: softly distended, NT/ no rebound, (+) BS  Extremities: +2 bilateral radial pulses. Trace bilateral pedal edema.   Skin:  + bilateral lower extremity hyperpigmentation. Dry skin.       LABS:                        14.8   7.61  )-----------( 121      ( 03 Jun 2019 08:10 )             46.3     06-03    136  |  93<L>  |  24<H>  ----------------------------<  115<H>  3.6   |  28  |  1.16    Ca    8.7      03 Jun 2019 06:07        Type + Screen (06.02.19 @ 05:21)    ABO Interpretation: A    Rh Interpretation: Positive    Antibody Screen: Negative        RADIOLOGY & ADDITIONAL TESTS:     TELE: Sinus w/ 2:1 AVB and mobitz type 1

## 2019-06-03 NOTE — PROGRESS NOTE ADULT - PROBLEM SELECTOR PLAN 1
Syncopal episode at home with 2nd degree (2:1) type 1 to 2 HB. Evaluated by EP and plans for PPM today  - not on AVN blockade  - NPO p MN  **spoke to wife at bedside

## 2019-06-03 NOTE — PROGRESS NOTE ADULT - ASSESSMENT
58 year old , obese male, with PMH of ETOH user (last drink 10 days ago as per patient), HTN, HLD, DM-2 (last A1c on 5/29 was 7.0, well managed w/o complications as per patient, on Metformin, endo Maddi Carballo), glaucoma, HAYLEE?, GERD, b/l hydrocele, Rt retinal detachment, iron def anemia. FH significant for premature CAD (father and uncle both had MI in their 50's). Patient presented to [facility] ED on 5/28 with complaint of several episodes of lightheadedness and 1 episode of Syncope earlier that day.  He was found to have symptomatic 2nd Degree HB. EP Evaluated and plans for PPM.

## 2019-06-03 NOTE — PROGRESS NOTE ADULT - ASSESSMENT
57 y/o M, ETOH user ( last drink 10 days ago as per patient), with PMH HTN, HLD, DM type 2 ( last A1c on 5/29 was 7.0, well managed w/o complications as per patient, on Metformin, endo Maddi Mcarthur), glaucoma, HAYLEE?, GERD, bl Hydrocele, R retinal detachment, iron def anemia.  FH significant for premature CAD ( father and uncle both had MI in their 50's).  Patient  presented to [facility]  ED on 5/28 with complaint of several episodes of lightheadedness and 1 episode of Syncope earlier that day. Transferred to this facility for PPM placement.   # Symptomatic second degree AV block  type 1 and 2:1 AVB   -Keep NPO for PPM today  - Has active type and screen  - Last echo10/2018 w/ EF 55-60%  - Hold all AVN blockers for now  - Prophylactic Lovenox on hold for procedure   - Continue telemetry monitoring.       [phone]

## 2019-06-03 NOTE — PROGRESS NOTE ADULT - SUBJECTIVE AND OBJECTIVE BOX
Mohsin Khan, MD  Attending Physician, Division Of Hospital Medicine  Pager: [phone], Office: [phone]  Off hour pager: [phone]    Patient is a 59y old  Male who presents with a chief complaint of Transferred from OSH for PPM    SUBJECTIVE / OVERNIGHT EVENTS:  Sitting in bed, watching sports in Cell, no c/o chest pain, SOB, NPO now for PPM  Tele- HR 50s, has 1HB, 2HB-type 1      MEDICATIONS  (STANDING):  atorvastatin 20 milliGRAM(s) Oral at bedtime  dextrose 5%. 1000 milliLiter(s) (50 mL/Hr) IV Continuous <Continuous>  dextrose 50% Injectable 12.5 Gram(s) IV Push once  dextrose 50% Injectable 25 Gram(s) IV Push once  dextrose 50% Injectable 25 Gram(s) IV Push once  dorzolamide 2%/timolol 0.5% Ophthalmic Solution 1 Drop(s) Both EYES two times a day  furosemide    Tablet 40 milliGRAM(s) Oral daily  hydrochlorothiazide 25 milliGRAM(s) Oral daily  insulin lispro (HumaLOG) corrective regimen sliding scale   SubCutaneous three times a day before meals  insulin lispro (HumaLOG) corrective regimen sliding scale   SubCutaneous at bedtime  lisinopril 10 milliGRAM(s) Oral daily  pantoprazole    Tablet 40 milliGRAM(s) Oral before breakfast    MEDICATIONS  (PRN):  acetaminophen   Tablet .. 650 milliGRAM(s) Oral every 6 hours PRN Mild Pain (1 - 3), Moderate Pain (4 - 6)  dextrose 40% Gel 15 Gram(s) Oral once PRN Blood Glucose LESS THAN 70 milliGRAM(s)/deciliter  glucagon  Injectable 1 milliGRAM(s) IntraMuscular once PRN Glucose LESS THAN 70 milligrams/deciliter      Vital Signs Last 24 Hrs  T(C): 36.7 (03 Jun 2019 04:24), Max: 36.7 (02 Jun 2019 12:05)  T(F): 98.1 (03 Jun 2019 04:24), Max: 98.1 (03 Jun 2019 04:24)  HR: 50 (03 Jun 2019 06:43) (50 - 74)  BP: 114/69 (03 Jun 2019 04:24) (101/70 - 114/69)  BP(mean): --  RR: 19 (03 Jun 2019 04:24) (17 - 19)  SpO2: 94% (03 Jun 2019 06:43) (91% - 99%)  CAPILLARY BLOOD GLUCOSE      POCT Blood Glucose.: 105 mg/dL (03 Jun 2019 06:53)  POCT Blood Glucose.: 124 mg/dL (02 Jun 2019 21:15)  POCT Blood Glucose.: 106 mg/dL (02 Jun 2019 16:33)  POCT Blood Glucose.: 111 mg/dL (02 Jun 2019 12:01)    I&O's Summary    02 Jun 2019 07:01  -  03 Jun 2019 07:00  --------------------------------------------------------  IN: 800 mL / OUT: 1300 mL / NET: -500 mL    03 Jun 2019 07:01  -  03 Jun 2019 10:55  --------------------------------------------------------  IN: 0 mL / OUT: 0 mL / NET: 0 mL        PHYSICAL EXAM:-  GENERAL: NAD, overweight  EYES: EOMI, PERRLA, conjunctiva and sclera clear  NECK: Supple, No JVD, no thyromegaly  CHEST/LUNG: Clear to auscultation bilaterally; No wheeze  HEART: Regular rate and rhythm; S1, S2 audible, No murmurs, rubs, or gallops  ABDOMEN: Soft, Nontender, Nondistended; Bowel sounds present  EXTREMITIES:  2+ Peripheral Pulses, No clubbing, cyanosis, 1+ LE edema  NEURO: AAOx3, no focal deficit      LABS:                        14.8   7.61  )-----------( 121      ( 03 Jun 2019 08:10 )             46.3     06-03    136  |  93<L>  |  24<H>  ----------------------------<  115<H>  3.6   |  28  |  1.16    Ca    8.7      03 Jun 2019 06:07      RADIOLOGY & ADDITIONAL TESTS:    Imaging Personally Reviewed: CXR, Old Echo in Oct 2018  Consultant(s) Notes Reviewed:  EP Card  Care Discussed with Consultants/Other Providers: EP Card

## 2019-06-04 ENCOUNTER — TRANSCRIPTION ENCOUNTER (OUTPATIENT)
Age: 59
End: 2019-06-04

## 2019-06-04 VITALS
TEMPERATURE: 98 F | HEART RATE: 65 BPM | SYSTOLIC BLOOD PRESSURE: 117 MMHG | DIASTOLIC BLOOD PRESSURE: 82 MMHG | RESPIRATION RATE: 18 BRPM | OXYGEN SATURATION: 96 %

## 2019-06-04 DIAGNOSIS — Z71.89 OTHER SPECIFIED COUNSELING: ICD-10-CM

## 2019-06-04 LAB
ANION GAP SERPL CALC-SCNC: 15 MMOL/L — SIGNIFICANT CHANGE UP (ref 5–17)
BUN SERPL-MCNC: 27 MG/DL — HIGH (ref 7–23)
CALCIUM SERPL-MCNC: 9 MG/DL — SIGNIFICANT CHANGE UP (ref 8.4–10.5)
CHLORIDE SERPL-SCNC: 91 MMOL/L — LOW (ref 96–108)
CHOLEST SERPL-MCNC: 231 MG/DL — HIGH (ref 10–199)
CO2 SERPL-SCNC: 29 MMOL/L — SIGNIFICANT CHANGE UP (ref 22–31)
CREAT SERPL-MCNC: 1.36 MG/DL — HIGH (ref 0.5–1.3)
GLUCOSE BLDC GLUCOMTR-MCNC: 122 MG/DL — HIGH (ref 70–99)
GLUCOSE BLDC GLUCOMTR-MCNC: 179 MG/DL — HIGH (ref 70–99)
GLUCOSE SERPL-MCNC: 119 MG/DL — HIGH (ref 70–99)
HDLC SERPL-MCNC: 58 MG/DL — SIGNIFICANT CHANGE UP
LIPID PNL WITH DIRECT LDL SERPL: 154 MG/DL — HIGH
POTASSIUM SERPL-MCNC: 3.5 MMOL/L — SIGNIFICANT CHANGE UP (ref 3.5–5.3)
POTASSIUM SERPL-SCNC: 3.5 MMOL/L — SIGNIFICANT CHANGE UP (ref 3.5–5.3)
SODIUM SERPL-SCNC: 135 MMOL/L — SIGNIFICANT CHANGE UP (ref 135–145)
TOTAL CHOLESTEROL/HDL RATIO MEASUREMENT: 4 RATIO — SIGNIFICANT CHANGE UP (ref 3.4–9.6)
TRIGL SERPL-MCNC: 93 MG/DL — SIGNIFICANT CHANGE UP (ref 10–149)
TSH SERPL-MCNC: 1.92 UIU/ML — SIGNIFICANT CHANGE UP (ref 0.27–4.2)

## 2019-06-04 PROCEDURE — 99239 HOSP IP/OBS DSCHRG MGMT >30: CPT

## 2019-06-04 PROCEDURE — 71046 X-RAY EXAM CHEST 2 VIEWS: CPT | Mod: 26

## 2019-06-04 PROCEDURE — 93010 ELECTROCARDIOGRAM REPORT: CPT

## 2019-06-04 RX ORDER — LISINOPRIL 2.5 MG/1
1 TABLET ORAL
Qty: 0 | Refills: 0 | DISCHARGE

## 2019-06-04 RX ORDER — FUROSEMIDE 40 MG
1 TABLET ORAL
Qty: 0 | Refills: 0 | DISCHARGE

## 2019-06-04 RX ORDER — PANTOPRAZOLE SODIUM 20 MG/1
1 TABLET, DELAYED RELEASE ORAL
Qty: 0 | Refills: 0 | DISCHARGE

## 2019-06-04 RX ORDER — PANTOPRAZOLE SODIUM 20 MG/1
1 TABLET, DELAYED RELEASE ORAL
Qty: 30 | Refills: 0
Start: 2019-06-04 | End: 2019-07-03

## 2019-06-04 RX ORDER — ACETAMINOPHEN 500 MG
2 TABLET ORAL
Qty: 0 | Refills: 0 | DISCHARGE
Start: 2019-06-04

## 2019-06-04 RX ORDER — METFORMIN HYDROCHLORIDE 850 MG/1
1 TABLET ORAL
Qty: 0 | Refills: 0 | DISCHARGE

## 2019-06-04 RX ORDER — FUROSEMIDE 40 MG
1 TABLET ORAL
Qty: 0 | Refills: 0 | DISCHARGE
Start: 2019-06-04

## 2019-06-04 RX ADMIN — Medication 25 MILLIGRAM(S): at 05:11

## 2019-06-04 RX ADMIN — LISINOPRIL 10 MILLIGRAM(S): 2.5 TABLET ORAL at 05:11

## 2019-06-04 RX ADMIN — Medication 2: at 12:16

## 2019-06-04 RX ADMIN — Medication 40 MILLIGRAM(S): at 05:11

## 2019-06-04 RX ADMIN — PANTOPRAZOLE SODIUM 40 MILLIGRAM(S): 20 TABLET, DELAYED RELEASE ORAL at 05:11

## 2019-06-04 NOTE — DISCHARGE NOTE PROVIDER - NSDCCPCAREPLAN_GEN_ALL_CORE_FT
PRINCIPAL DISCHARGE DIAGNOSIS  Diagnosis: Second degree AV block  Assessment and Plan of Treatment: You had a pacemaker placed  Follow up in EP clinic as scheduled      SECONDARY DISCHARGE DIAGNOSES  Diagnosis: Acute CHF  Assessment and Plan of Treatment: Weigh yourself daily.  If you gain 3lbs in 3 days, or 5lbs in a week call your Health Care Provider.  Do not eat or drink foods containing more than 2000mg of salt (sodium) in your diet every day.  Call your Health Care Provider if you have any swelling or increased swelling in your feet, ankles, and/or stomach.  The Pt was provided with CHF diet instruction (low sodium diet, daily weights, label reading, Heart Healthy Cooking Tips & Heart Healthy shopping Tips).  Take all of your medication as directed.  If you become dizzy call your Health Care Provider.    Diagnosis: Obesity  Assessment and Plan of Treatment: Weight loss program strongly encouraged  Out patient sleep study recommended    Diagnosis: DM2 (diabetes mellitus, type 2)  Assessment and Plan of Treatment: HgA1C this admission.  Make sure you get your HgA1c checked every three months.  If you take oral diabetes medications, check your blood glucose two times a day.  If you take insulin, check your blood glucose before meals and at bedtime.  It's important not to skip any meals.  Keep a log of your blood glucose results and always take it with you to your doctor appointments.  Keep a list of your current medications including injectables and over the counter medications and bring this medication list with you to all your doctor appointments.  If you have not seen your ophthalmologist this year call for appointment.  Check your feet daily for redness, sores, or openings. Do not self treat. If no improvement in two days call your primary care physician for an appointment.  Low blood sugar (hypoglycemia) is a blood sugar below 70mg/dl. Check your blood sugar if you feel signs/symptoms of hypoglycemia. If your blood sugar is below 70 take 15 grams of carbohydrates (ex 4 oz of apple juice, 3-4 glucose tablets, or 4-6 oz of regular soda) wait 15 minutes and repeat blood sugar to make sure it comes up above 70.  If your blood sugar is above 70 and you are due for a meal, have a meal.  If you are not due for a meal have a snack.  This snack helps keeps your blood sugar at a safe range.    Diagnosis: ETOH abuse  Assessment and Plan of Treatment: Treatment for alcohol dependence and withdrawal includes medicines, detoxification, and therapy. Diagnosing and treating alcohol dependence and withdrawal as soon as possible may relieve or prevent symptoms. With treatment and care, your alcohol dependence and withdrawal may be controlled, and your quality of life improved.  Keep all follow up appointments. Write down any questions you may have. This way you will remember to ask these questions during your next visit.    Diagnosis: Essential hypertension  Assessment and Plan of Treatment: Low salt diet  Activity as tolerated.  Take all medication as prescribed.  Follow up with your medical doctor for routine blood pressure monitoring at your next visit.  Notify your doctor if you have any of the following symptoms:   Dizziness, Lightheadedness, Blurry vision, Headache, Chest pain, Shortness of breath

## 2019-06-04 NOTE — PROGRESS NOTE ADULT - ASSESSMENT
58 year old , obese male, with PMH of ETOH user (last drink 10 days ago as per patient), HTN, HLD, DM-2 (last A1c on 5/29 was 7.0, well managed w/o complications as per patient, on Metformin, endo Maddi Carballo), glaucoma, HAYLEE?, GERD, b/l hydrocele, Rt retinal detachment, iron def anemia. FH significant for premature CAD (father and uncle both had MI in their 50's). Patient presented to [facility] ED on 5/28 with complaint of several episodes of lightheadedness and 1 episode of Syncope earlier that day.  He was found to have symptomatic 2nd Degree HB. S/P PPM yesterday.

## 2019-06-04 NOTE — PROGRESS NOTE ADULT - PROVIDER SPECIALTY LIST ADULT
Electrophysiology
Electrophysiology
Internal Medicine
Electrophysiology
Internal Medicine

## 2019-06-04 NOTE — PROGRESS NOTE ADULT - ASSESSMENT
57 y/o M, ETOH user ( last drink 10 days ago as per patient), with PMH HTN, HLD, DM type 2 ( last A1c on 5/29 was 7.0, well managed w/o complications as per patient, on Metformin, endo Maddi Mcarthur), glaucoma, HAYLEE?, GERD, bl Hydrocele, R retinal detachment, iron def anemia.  FH significant for premature CAD ( father and uncle both had MI in their 50's).  Patient  presented to [facility]  ED on 5/28 with complaint of several episodes of lightheadedness and 1 episode of Syncope earlier that day. Transferred to this facility for PPM placement.     # Symptomatic second degree AV block  type 1 and 2:1 AVB   - Last echo10/2018 w/ EF 55-60%  - s/p [employer] PPM implant on 6/3/19  - PPM check by [employer] rep with normal sensing and pacing thresholds  - Post procedure pacemaker teachings d/w patient, questions answered  - Follow up wound check with EPS on 6/24/19 at 1pm  - Awaiting official result of CXR r/o pneumothorax; good lead placement    #[id number]

## 2019-06-04 NOTE — DISCHARGE NOTE NURSING/CASE MANAGEMENT/SOCIAL WORK - NSDCPEPT PROEDHF_GEN_ALL_CORE
Low salt diet/Monitor weight daily/Activities as tolerated/Call primary care provider for follow up after discharge/Report signs and symptoms to primary care provider

## 2019-06-04 NOTE — PROGRESS NOTE ADULT - SUBJECTIVE AND OBJECTIVE BOX
24H hour events:   Sitting up in bed, comfortable; no events overnight    MEDICATIONS:  furosemide  Tablet 40 milliGRAM(s) Oral daily  hydrochlorothiazide 25 milliGRAM(s) Oral daily  lisinopril 10 milliGRAM(s) Oral daily  acetaminophen   Tablet .. 650 milliGRAM(s) Oral every 6 hours PRN  pantoprazole  Tablet 40 milliGRAM(s) Oral before breakfast  atorvastatin 20 milliGRAM(s) Oral at bedtime  dextrose 40% Gel 15 Gram(s) Oral once PRN  dextrose 50% Injectable 12.5 Gram(s) IV Push once  dextrose 50% Injectable 25 Gram(s) IV Push once  dextrose 50% Injectable 25 Gram(s) IV Push once  glucagon  Injectable 1 milliGRAM(s) IntraMuscular once PRN  insulin lispro (HumaLOG) corrective regimen sliding scale   SubCutaneous three times a day before meals  insulin lispro (HumaLOG) corrective regimen sliding scale   SubCutaneous at bedtime  dextrose 5%. 1000 milliLiter(s) IV Continuous <Continuous>  dorzolamide 2%/timolol 0.5% Ophthalmic Solution 1 Drop(s) Both EYES two times a day      REVIEW OF SYSTEMS:  Constitutional: no fever or chills  Neuro: denies dizziness or lightheadedness  Respiratory: denies sob or cough  Cardiac: denies chest pain or palpitations  GI: denies nausea or vomiting  : denies dysuria or frequency    PHYSICAL EXAM:  T(C): 36.7 (06-04-19 @ 11:53), Max: 36.8 (06-03-19 @ 15:30)  HR: 65 (06-04-19 @ 11:53) (61 - 74)  BP: 117/82 (06-04-19 @ 11:53) (103/75 - 146/78)  RR: 18 (06-04-19 @ 11:53) (18 - 18)  SpO2: 96% (06-04-19 @ 11:53) (93% - 98%)  Wt(kg): --  I&O's Summary    03 Jun 2019 07:01  -  04 Jun 2019 07:00  --------------------------------------------------------  IN: 350 mL / OUT: 500 mL / NET: -150 mL    04 Jun 2019 07:01  -  04 Jun 2019 13:46  --------------------------------------------------------  IN: 0 mL / OUT: 1200 mL / NET: -1200 mL        Appearance: obese, in NAD  HEENT: Normocephalic, atraumatic, neck supple  Cardiovascular: Normal S1 S2, No JVD, No murmurs  Respiratory: Lungs clear to auscultation	  Psychiatry: A & O x 3, Mood & affect appropriate  Gastrointestinal:  Soft, Non-tender, + BS	  Skin: No rashes,  No cyanosis, left infraclavicular PPM site	without hematoma or ecchymosis, steristrips intact  Extremities: GARCIA spontaneously, no clubbing, cyanosis, chronic B/L LE edema  Vascular: Peripheral pulses palpable 2+ bilaterally        LABS:	 	    CBC Full  -  ( 03 Jun 2019 08:10 )  WBC Count : 7.61 K/uL  Hemoglobin : 14.8 g/dL  Hematocrit : 46.3 %  Platelet Count - Automated : 121 K/uL  Mean Cell Volume : 87.9 fl  Mean Cell Hemoglobin : 28.1 pg  Mean Cell Hemoglobin Concentration : 32.0 gm/dL  Auto Neutrophil # : x  Auto Lymphocyte # : x  Auto Monocyte # : x  Auto Eosinophil # : x  Auto Basophil # : x  Auto Neutrophil % : x  Auto Lymphocyte % : x  Auto Monocyte % : x  Auto Eosinophil % : x  Auto Basophil % : x    06-04    135  |  91<L>  |  27<H>  ----------------------------<  119<H>  3.5   |  29  |  1.36<H>  06-03    136  |  93<L>  |  24<H>  ----------------------------<  115<H>  3.6   |  28  |  1.16    Ca    9.0      04 Jun 2019 06:12  Ca    8.7      03 Jun 2019 06:07        proBNP:   Lipid Profile:   HgA1c:   TSH: Thyroid Stimulating Hormone, Serum: 1.92 uIU/mL (06-04 @ 09:33)      TELEMETRY: NSR/1st Degree AV Block 70-90's  	    ECG: Asense Vpaced at 60 ppm	    EXAM:  XR CHEST PORTABLE ROUTINE 1V                              PROCEDURE DATE:  05/31/2019        INTERPRETATION:  CLINICAL INFORMATION: Hypoxic. Prepacemaker placement.    EXAM: Frontal chest radiograph dated 5/31/2019.    COMPARISON: None available.    FINDINGS:  The lungs are clear.  There are no pleural effusions or pneumothorax.  The cardiomediastinal silhouette is not well evaluated on this projection.    IMPRESSION:   Clear lungs.      ARIADNA HAWTHORNE M.D., RADIOLOGY RESIDENT  This document has been electronically signed.  MARINA NEFF M.D., ATTENDING RADIOLOGIST  This document has been electronically signed. Harman  3 2019  2:43PM

## 2019-06-04 NOTE — PROGRESS NOTE ADULT - PROBLEM SELECTOR PROBLEM 1
Second degree AV block, Mobitz type II
Heart block, AV
Heart block, AV
Second degree AV block, Mobitz type II

## 2019-06-04 NOTE — PROGRESS NOTE ADULT - PROBLEM SELECTOR PLAN 5
-C/w lisinopril and HCTZ.

## 2019-06-04 NOTE — DISCHARGE NOTE PROVIDER - CARE PROVIDER_API CALL
Greg Alonzo (MD)  Cardiac Electrophysiology; Cardiology; Internal Medicine  [address] [place] [address]  Phone: [phone]  Follow Up Time:

## 2019-06-04 NOTE — DISCHARGE NOTE NURSING/CASE MANAGEMENT/SOCIAL WORK - NSDCDPATPORTLINK_GEN_ALL_CORE
You can access the [facility] Portal, offered by [facility], by registering with the following website: [email or web address]

## 2019-06-04 NOTE — PROGRESS NOTE ADULT - PROBLEM SELECTOR PLAN 4
c/w Home oral agent for T2DM  -C/w atorvastatin for hyperlipidemia.
-Hold home metformin.   -LISSA QAC/HS for now.   -Diabetic DASH diet.  -C/w atorvastatin for hyperlipidemia.  -History of bariatric surgery about 10 years ago in [place].   -Nutrition eval.
-Hold home metformin.   -LISSA QAC/HS for now.   -Diabetic DASH diet.  -C/w atorvastatin for hyperlipidemia.  -History of bariatric surgery about 10 years ago in [place].   -Nutrition eval.
Hold home metformin.   -LISSA QAC/HS for now.   -Diabetic DASH diet.  -C/w atorvastatin for hyperlipidemia.  -History of bariatric surgery about 10 years ago in [place].   -Nutrition eval.

## 2019-06-04 NOTE — PROGRESS NOTE ADULT - PROBLEM SELECTOR PROBLEM 7
Glaucoma of both eyes, unspecified glaucoma type
Gastroesophageal reflux disease without esophagitis
Gastroesophageal reflux disease without esophagitis
Glaucoma of both eyes, unspecified glaucoma type

## 2019-06-04 NOTE — PROGRESS NOTE ADULT - PROBLEM SELECTOR PLAN 2
Outside echo with normal systolic LV function, EF 55-60% but grade 2 diastolic dysfunction.   -C/w lasix 40mg PO daily.   -Chronic LE edema (R>L) with chronic RLE hyperpigmentation  -LE elevation.
-Outside echo with normal systolic LV function but grade 2 diastolic dysfunction.   -C/w lasix 40mg PO daily.   -Chronic LE edema (R>L) with chronic RLE hyperpigmentation  -LE elevation.
-Outside echo with normal systolic LV function but grade 2 diastolic dysfunction.   -C/w lasix 40mg PO daily.   -Chronic LE edema (R>L) with chronic RLE hyperpigmentation  -LE elevation.
Outside echo with normal systolic LV function, EF 55-60% but grade 2 diastolic dysfunction.   -C/w lasix 40mg PO daily.   -Chronic LE edema (R>L) with chronic RLE hyperpigmentation  -LE elevation.

## 2019-06-04 NOTE — PROGRESS NOTE ADULT - REASON FOR ADMISSION
Transferred from [facility] for PPM
Transferred from [facility] for PPM
Transferred from [facility] for PPM
Transferred from [facility] for PPM
Transferred from [facility] for PPM
Transferred from [facility] for PPM
Transferred from [facility] for PPM

## 2019-06-04 NOTE — PROGRESS NOTE ADULT - SUBJECTIVE AND OBJECTIVE BOX
Mohsin Khan, MD  Attending Physician, Division Of Hospital Medicine  Pager: [phone], Office: [phone]  Off hour pager: [phone]    Patient is a 59y old  Male who presents with a chief complaint of Transferred from OSH for PPM    SUBJECTIVE / OVERNIGHT EVENTS:  Sitting in bed, no chest pain, SOB, feels better, Tele- pacing rhythm controlled rate, VSS    MEDICATIONS  (STANDING):  atorvastatin 20 milliGRAM(s) Oral at bedtime  dextrose 5%. 1000 milliLiter(s) (50 mL/Hr) IV Continuous <Continuous>  dextrose 50% Injectable 12.5 Gram(s) IV Push once  dextrose 50% Injectable 25 Gram(s) IV Push once  dextrose 50% Injectable 25 Gram(s) IV Push once  dorzolamide 2%/timolol 0.5% Ophthalmic Solution 1 Drop(s) Both EYES two times a day  furosemide    Tablet 40 milliGRAM(s) Oral daily  hydrochlorothiazide 25 milliGRAM(s) Oral daily  insulin lispro (HumaLOG) corrective regimen sliding scale   SubCutaneous three times a day before meals  insulin lispro (HumaLOG) corrective regimen sliding scale   SubCutaneous at bedtime  lisinopril 10 milliGRAM(s) Oral daily  pantoprazole    Tablet 40 milliGRAM(s) Oral before breakfast    MEDICATIONS  (PRN):  acetaminophen   Tablet .. 650 milliGRAM(s) Oral every 6 hours PRN Mild Pain (1 - 3), Moderate Pain (4 - 6)  dextrose 40% Gel 15 Gram(s) Oral once PRN Blood Glucose LESS THAN 70 milliGRAM(s)/deciliter  glucagon  Injectable 1 milliGRAM(s) IntraMuscular once PRN Glucose LESS THAN 70 milligrams/deciliter      Vital Signs Last 24 Hrs  T(C): 36.7 (04 Jun 2019 11:53), Max: 36.8 (03 Jun 2019 15:30)  T(F): 98 (04 Jun 2019 11:53), Max: 98.2 (03 Jun 2019 15:30)  HR: 65 (04 Jun 2019 11:53) (61 - 74)  BP: 117/82 (04 Jun 2019 11:53) (103/75 - 146/78)  BP(mean): --  RR: 18 (04 Jun 2019 11:53) (18 - 18)  SpO2: 96% (04 Jun 2019 11:53) (93% - 98%)  CAPILLARY BLOOD GLUCOSE      POCT Blood Glucose.: 179 mg/dL (04 Jun 2019 12:06)  POCT Blood Glucose.: 122 mg/dL (04 Jun 2019 08:04)  POCT Blood Glucose.: 115 mg/dL (03 Jun 2019 21:18)  POCT Blood Glucose.: 85 mg/dL (03 Jun 2019 16:35)    I&O's Summary    03 Jun 2019 07:01  -  04 Jun 2019 07:00  --------------------------------------------------------  IN: 350 mL / OUT: 500 mL / NET: -150 mL    04 Jun 2019 07:01  -  04 Jun 2019 12:36  --------------------------------------------------------  IN: 0 mL / OUT: 1200 mL / NET: -1200 mL        PHYSICAL EXAM:-  GENERAL: NAD, well-developed  EYES: EOMI, PERRLA, conjunctiva and sclera clear  NECK: Supple, No JVD, no thyromegaly  CHEST/LUNG: Clear to auscultation bilaterally; No wheeze, left chest wall PPM in place  HEART: Regular rate and rhythm; S1, S2 audible, No murmurs, rubs, or gallops  ABDOMEN: Soft, Nontender, Nondistended; Bowel sounds present  EXTREMITIES:  2+ Peripheral Pulses, No clubbing, cyanosis, or edema  NEURO: AAOx3, no focal deficit      LABS:                        14.8   7.61  )-----------( 121      ( 03 Jun 2019 08:10 )             46.3     06-04    135  |  91<L>  |  27<H>  ----------------------------<  119<H>  3.5   |  29  |  1.36<H>    Ca    9.0      04 Jun 2019 06:12      RADIOLOGY & ADDITIONAL TESTS:    Imaging Personally Reviewed: CXR  Consultant(s) Notes Reviewed: EP Card  Care Discussed with Consultants/Other Providers: EP Card

## 2019-06-04 NOTE — DISCHARGE NOTE PROVIDER - HOSPITAL COURSE
58 year old [place], obese male, with PMH of ETOH user (last drink 10 days ago as per patient), HTN, HLD, DM-2 (last A1c on 5/29 was 7.0, well managed w/o complications as per patient, on Metformin, endo Maddi Carballo), glaucoma, HAYLEE?, GERD, b/l hydrocele, Rt retinal detachment, iron def anemia. FH significant for premature CAD (father and uncle both had MI in their 50's). Patient presented to [facility] ED on 5/28 with complaint of several episodes of lightheadedness and 1 episode of Syncope earlier that day.  He was found to have symptomatic 2nd Degree HB. EP Evaluated and plans for PPM.  Second degree AV block, Mobitz type II.  Plan: Syncopal episode at home with 2nd degree (2:1) type 1 to 2 HB. Evaluated by EP and s/p PPM, not on AVN blockade    Chronic diastolic heart failure.  Plan: Outside echo with normal systolic LV function, EF 55-60% but grade 2 diastolic dysfunction. C/w lasix 40mg PO daily. Chronic LE edema (R>L) with chronic RLE hyperpigmentation, LE elevation. HAYLEE on CPAP.  Plan: C/w CPAP at bedtime, outpatient sleep study, Weight loss counseling.    - advised to loose weight. Type 2 diabetes mellitus without complication, without long-term current use of insulin.  Plan: Hold home metformin. Diabetic DASH diet.  C/w atorvastatin for hyperlipidemia.  History of bariatric surgery about 10 years ago in [place]. Essential hypertension.  C/w lisinopril and HCTZ.  Alcohol use. Plan: -Last drink reportedly 10 days ago and patient says only drank 2 drinks per night. No withdrawal symptoms noted. D/C to home. Follow up in EP clinic as scheduled. 58 year old , obese male, with PMH of ETOH user (last drink 10 days ago as per patient), HTN, HLD, DM-2 (last A1c on 5/29 was 7.0, well managed w/o complications as per patient, on Metformin, endo Maddi Carballo), glaucoma, HAYLEE?, GERD, b/l hydrocele, Rt retinal detachment, iron def anemia. FH significant for premature CAD (father and uncle both had MI in their 50's). Patient presented to [facility] ED on 5/28 with complaint of several episodes of lightheadedness and 1 episode of Syncope earlier that day.  He was found to have symptomatic 2nd Degree HB. EP Evaluated. The patient had PPM placement on 6/4/19 by EP. Stable from EP cardiology stand point. Outside echo with normal systolic LV function, EF 55-60% but grade 2 diastolic dysfunction.     He remained stable on his Home medicines. Advised to reduce weight and follow-up with Pulmonary for outpatient sleep study. He will come back to EP cardiology on 6/24/19 at 1pm.

## 2019-06-04 NOTE — PROGRESS NOTE ADULT - PROBLEM SELECTOR PLAN 1
Syncopal episode at home with 2nd degree (2:1) type 1 to 2 HB. S/P PPM yesterday  - EP Cardiology reevaluated, CXR looks no pneumothorax  - d/c home today, outpatient EP f/u   **spoke to wife at bedside Syncopal episode at home with 2nd degree (2:1) type 1 to 2 HB. S/P PPM yesterday  - spke to EP Cardiology-[id number], reevaluated, cleared for d/c. CXR looks no    pneumothorax  - d/c home today, outpatient EP f/u on 6/24 at 1pm  **spoke to wife at bedside

## 2019-06-04 NOTE — PROGRESS NOTE ADULT - PROBLEM SELECTOR PLAN 3
C/w CPAP at bedtime, outpatient sleep study  -Weight loss counseling.  - advised to loose weight
-C/w CPAP at bedtime.  -Weight loss counseling.
-C/w CPAP at bedtime.  -Weight loss counseling.
C/w CPAP at bedtime, outpatient sleep stude  -Weight loss counseling.  - advised to loose weight

## 2019-06-05 DIAGNOSIS — R09.02 HYPOXEMIA: ICD-10-CM

## 2019-06-05 DIAGNOSIS — R10.13 EPIGASTRIC PAIN: ICD-10-CM

## 2019-06-05 DIAGNOSIS — Z72.89 OTHER PROBLEMS RELATED TO LIFESTYLE: ICD-10-CM

## 2019-06-05 DIAGNOSIS — G47.33 OBSTRUCTIVE SLEEP APNEA (ADULT) (PEDIATRIC): ICD-10-CM

## 2019-06-05 DIAGNOSIS — I51.7 CARDIOMEGALY: ICD-10-CM

## 2019-06-05 DIAGNOSIS — E66.01 MORBID (SEVERE) OBESITY DUE TO EXCESS CALORIES: ICD-10-CM

## 2019-06-05 DIAGNOSIS — Z98.84 BARIATRIC SURGERY STATUS: ICD-10-CM

## 2019-06-05 DIAGNOSIS — R60.0 LOCALIZED EDEMA: ICD-10-CM

## 2019-06-05 DIAGNOSIS — I50.32 CHRONIC DIASTOLIC (CONGESTIVE) HEART FAILURE: ICD-10-CM

## 2019-06-05 DIAGNOSIS — I11.0 HYPERTENSIVE HEART DISEASE WITH HEART FAILURE: ICD-10-CM

## 2019-06-05 DIAGNOSIS — I44.2 ATRIOVENTRICULAR BLOCK, COMPLETE: ICD-10-CM

## 2019-06-05 DIAGNOSIS — I34.0 NONRHEUMATIC MITRAL (VALVE) INSUFFICIENCY: ICD-10-CM

## 2019-06-05 DIAGNOSIS — R55 SYNCOPE AND COLLAPSE: ICD-10-CM

## 2019-06-05 DIAGNOSIS — E11.9 TYPE 2 DIABETES MELLITUS WITHOUT COMPLICATIONS: ICD-10-CM

## 2019-06-13 ENCOUNTER — NON-APPOINTMENT (OUTPATIENT)
Age: 59
End: 2019-06-13

## 2019-06-13 ENCOUNTER — APPOINTMENT (OUTPATIENT)
Dept: ELECTROPHYSIOLOGY | Facility: CLINIC | Age: 59
End: 2019-06-13
Payer: MEDICAID

## 2019-06-13 VITALS
HEIGHT: 68 IN | SYSTOLIC BLOOD PRESSURE: 104 MMHG | OXYGEN SATURATION: 95 % | WEIGHT: 315 LBS | BODY MASS INDEX: 47.74 KG/M2 | HEART RATE: 68 BPM | DIASTOLIC BLOOD PRESSURE: 74 MMHG

## 2019-06-13 PROCEDURE — 99024 POSTOP FOLLOW-UP VISIT: CPT

## 2019-06-13 PROCEDURE — 93280 PM DEVICE PROGR EVAL DUAL: CPT

## 2019-06-20 ENCOUNTER — APPOINTMENT (OUTPATIENT)
Dept: INTERNAL MEDICINE | Facility: CLINIC | Age: 59
End: 2019-06-20

## 2019-06-24 ENCOUNTER — APPOINTMENT (OUTPATIENT)
Dept: ELECTROPHYSIOLOGY | Facility: CLINIC | Age: 59
End: 2019-06-24

## 2019-07-10 PROCEDURE — 86850 RBC ANTIBODY SCREEN: CPT

## 2019-07-10 PROCEDURE — C1892: CPT

## 2019-07-10 PROCEDURE — 80048 BASIC METABOLIC PNL TOTAL CA: CPT

## 2019-07-10 PROCEDURE — 82962 GLUCOSE BLOOD TEST: CPT

## 2019-07-10 PROCEDURE — 80053 COMPREHEN METABOLIC PANEL: CPT

## 2019-07-10 PROCEDURE — 80061 LIPID PANEL: CPT

## 2019-07-10 PROCEDURE — C1785: CPT

## 2019-07-10 PROCEDURE — 94660 CPAP INITIATION&MGMT: CPT

## 2019-07-10 PROCEDURE — 93005 ELECTROCARDIOGRAM TRACING: CPT

## 2019-07-10 PROCEDURE — 71045 X-RAY EXAM CHEST 1 VIEW: CPT

## 2019-07-10 PROCEDURE — 33208 INSRT HEART PM ATRIAL & VENT: CPT

## 2019-07-10 PROCEDURE — 85027 COMPLETE CBC AUTOMATED: CPT

## 2019-07-10 PROCEDURE — 84443 ASSAY THYROID STIM HORMONE: CPT

## 2019-07-10 PROCEDURE — 86901 BLOOD TYPING SEROLOGIC RH(D): CPT

## 2019-07-10 PROCEDURE — 86900 BLOOD TYPING SEROLOGIC ABO: CPT

## 2019-07-10 PROCEDURE — 84100 ASSAY OF PHOSPHORUS: CPT

## 2019-07-10 PROCEDURE — 71046 X-RAY EXAM CHEST 2 VIEWS: CPT

## 2019-07-10 PROCEDURE — 86803 HEPATITIS C AB TEST: CPT

## 2019-07-10 PROCEDURE — C1898: CPT

## 2019-07-10 PROCEDURE — 83735 ASSAY OF MAGNESIUM: CPT

## 2019-07-24 PROBLEM — Z00.00 ENCOUNTER FOR PREVENTIVE HEALTH EXAMINATION: Noted: 2019-07-24

## 2019-07-29 ENCOUNTER — APPOINTMENT (OUTPATIENT)
Dept: INTERNAL MEDICINE | Facility: CLINIC | Age: 59
End: 2019-07-29
Payer: MEDICAID

## 2019-07-29 ENCOUNTER — APPOINTMENT (OUTPATIENT)
Dept: INTERNAL MEDICINE | Facility: CLINIC | Age: 59
End: 2019-07-29

## 2019-07-29 VITALS
SYSTOLIC BLOOD PRESSURE: 124 MMHG | DIASTOLIC BLOOD PRESSURE: 84 MMHG | WEIGHT: 315 LBS | RESPIRATION RATE: 12 BRPM | BODY MASS INDEX: 55.5 KG/M2 | HEART RATE: 72 BPM

## 2019-07-29 VITALS
BODY MASS INDEX: 47.74 KG/M2 | DIASTOLIC BLOOD PRESSURE: 80 MMHG | WEIGHT: 315 LBS | SYSTOLIC BLOOD PRESSURE: 132 MMHG | HEIGHT: 68 IN

## 2019-07-29 VITALS — HEART RATE: 76 BPM | OXYGEN SATURATION: 94 %

## 2019-07-29 PROCEDURE — 99214 OFFICE O/P EST MOD 30 MIN: CPT | Mod: 25

## 2019-07-29 PROCEDURE — 36415 COLL VENOUS BLD VENIPUNCTURE: CPT

## 2019-07-29 NOTE — HEALTH RISK ASSESSMENT
[Yes] : Yes [2 - 3 times a week (3 pts)] : 2 - 3  times a week (3 points) [1 or 2 (0 pts)] : 1 or 2 (0 points) [Never (0 pts)] : Never (0 points) [No] : In the past 12 months have you used drugs other than those required for medical reasons? No [1] : 2) Feeling down, depressed, or hopeless for several days (1) [] : No [Audit-CScore] : 3 [[id number]] : 2

## 2019-07-29 NOTE — HISTORY OF PRESENT ILLNESS
[FreeTextEntry1] : dm, htn, gerd, obesity, edema, b12/iron/vit d def\par mobitz 2 hrt block s/p ppm.   [de-identified] : 60 y/o male with a hx of obesity - s/p bariatric surg in the past, hx htn and dm, glaucoma s/p surgery, edema, gerd, b12 def, vit D def, iron def, s/p admission 5/28/19 with syncopal episode.  Was found to be in 2nd degree HB - Mobitz 2.  Was transferred to [facility] for PPM.  \par Has followed up with EPS. \par no CV sx since he was in the hospital.  \par Has been taking the meds and supplementation. \par He had surgery for retinal detachment on the right. With continued blurry vision at the right eye - improving\par diet has been a bit better. Has been exercising with cycling nightly.\par Some wt loss in the hospital.  Has been regaining. \par no polyuria, polydipsia, polyphagia\par no noted cv sx except the improved le edema. no cp, palpitations, dizziness, bruce, pnd, orthopnea. \par no noted bleeding, bruising. \par no neuro sx\par GERD remains controlled. \par Has been having some knee/ankle pains.  Occ soreness at the arms\par occ feels depressed when sitting alone in the store.  situational - no noted anhedonia.\par \par for flu vaccine\par \par colon - has never had.

## 2019-07-29 NOTE — REVIEW OF SYSTEMS
[Vision Problems] : vision problems [Lower Ext Edema] : lower extremity edema [Joint Pain] : joint pain [Fainting] : fainting [Negative] : Heme/Lymph [Fever] : no fever [Chills] : no chills [Fatigue] : no fatigue [Night Sweats] : no night sweats [Discharge] : no discharge [Recent Change In Weight] : ~T no recent weight change [Pain] : no pain [Redness] : no redness [Dryness] : no dryness [Itching] : no itching [Chest Pain] : no chest pain [Palpitations] : no palpitations [Claudication] : no  leg claudication [Orthopena] : no orthopnea [Paroysmal Nocturnal Dyspnea] : no paroysmal nocturnal dyspnea [Joint Stiffness] : no joint stiffness [Muscle Pain] : no muscle pain [Muscle Weakness] : no muscle weakness [Back Pain] : no back pain [Joint Swelling] : no joint swelling [Suicidal] : not suicidal

## 2019-07-29 NOTE — PHYSICAL EXAM
[No Carotid Bruits] : no carotid bruits [Pedal Pulses Present] : the pedal pulses are present [No Abdominal Bruit] : a ~M bruit was not heard ~T in the abdomen [Normal Posterior Cervical Nodes] : no posterior cervical lymphadenopathy [Normal Anterior Cervical Nodes] : no anterior cervical lymphadenopathy [Normal] : normal gait, coordination grossly intact, no focal deficits and deep tendon reflexes were 2+ and symmetric [Speech Grossly Normal] : speech grossly normal [Memory Grossly Normal] : memory grossly normal [Normal Affect] : the affect was normal [Alert and Oriented x3] : oriented to person, place, and time [Normal Insight/Judgement] : insight and judgment were intact [Normal Mood] : the mood was normal [de-identified] : obese [de-identified] : +++ le edema

## 2019-07-30 LAB
ANION GAP SERPL CALC-SCNC: 14 MMOL/L
BUN SERPL-MCNC: 14 MG/DL
CALCIUM SERPL-MCNC: 9 MG/DL
CHLORIDE SERPL-SCNC: 98 MMOL/L
CO2 SERPL-SCNC: 26 MMOL/L
CREAT SERPL-MCNC: 0.97 MG/DL
GLUCOSE SERPL-MCNC: 114 MG/DL
MAGNESIUM SERPL-MCNC: 2 MG/DL
POTASSIUM SERPL-SCNC: 4.6 MMOL/L
SODIUM SERPL-SCNC: 138 MMOL/L

## 2019-08-02 RX ORDER — LISINOPRIL 2.5 MG/1
1 TABLET ORAL
Qty: 0 | Refills: 0 | DISCHARGE

## 2019-08-02 RX ORDER — FUROSEMIDE 40 MG
1 TABLET ORAL
Qty: 0 | Refills: 0 | DISCHARGE

## 2019-08-16 ENCOUNTER — RX RENEWAL (OUTPATIENT)
Age: 59
End: 2019-08-16

## 2019-09-02 ENCOUNTER — RX RENEWAL (OUTPATIENT)
Age: 59
End: 2019-09-02

## 2019-09-16 ENCOUNTER — APPOINTMENT (OUTPATIENT)
Dept: INTERNAL MEDICINE | Facility: CLINIC | Age: 59
End: 2019-09-16

## 2019-10-18 ENCOUNTER — APPOINTMENT (OUTPATIENT)
Dept: ELECTROPHYSIOLOGY | Facility: CLINIC | Age: 59
End: 2019-10-18
Payer: MEDICAID

## 2019-10-18 VITALS
HEART RATE: 80 BPM | OXYGEN SATURATION: 97 % | DIASTOLIC BLOOD PRESSURE: 96 MMHG | HEIGHT: 68 IN | SYSTOLIC BLOOD PRESSURE: 146 MMHG

## 2019-10-18 PROBLEM — I44.30 UNSPECIFIED ATRIOVENTRICULAR BLOCK: Chronic | Status: ACTIVE | Noted: 2019-05-28

## 2019-10-18 PROBLEM — E66.9 OBESITY, UNSPECIFIED: Chronic | Status: ACTIVE | Noted: 2019-05-28

## 2019-10-18 PROBLEM — I10 ESSENTIAL (PRIMARY) HYPERTENSION: Chronic | Status: ACTIVE | Noted: 2019-05-28

## 2019-10-18 PROBLEM — E11.9 TYPE 2 DIABETES MELLITUS WITHOUT COMPLICATIONS: Chronic | Status: ACTIVE | Noted: 2019-05-28

## 2019-10-18 PROCEDURE — 93280 PM DEVICE PROGR EVAL DUAL: CPT

## 2019-12-17 ENCOUNTER — RX RENEWAL (OUTPATIENT)
Age: 59
End: 2019-12-17

## 2019-12-18 ENCOUNTER — RX RENEWAL (OUTPATIENT)
Age: 59
End: 2019-12-18

## 2020-01-01 ENCOUNTER — APPOINTMENT (OUTPATIENT)
Dept: INTERNAL MEDICINE | Facility: CLINIC | Age: 60
End: 2020-01-01
Payer: MEDICAID

## 2020-01-01 ENCOUNTER — RX RENEWAL (OUTPATIENT)
Age: 60
End: 2020-01-01

## 2020-01-01 ENCOUNTER — APPOINTMENT (OUTPATIENT)
Dept: ELECTROPHYSIOLOGY | Facility: CLINIC | Age: 60
End: 2020-01-01
Payer: MEDICAID

## 2020-01-01 ENCOUNTER — APPOINTMENT (OUTPATIENT)
Dept: ELECTROPHYSIOLOGY | Facility: CLINIC | Age: 60
End: 2020-01-01

## 2020-01-01 ENCOUNTER — APPOINTMENT (OUTPATIENT)
Dept: PULMONOLOGY | Facility: CLINIC | Age: 60
End: 2020-01-01

## 2020-01-01 ENCOUNTER — APPOINTMENT (OUTPATIENT)
Dept: OPHTHALMOLOGY | Facility: CLINIC | Age: 60
End: 2020-01-01

## 2020-01-01 ENCOUNTER — APPOINTMENT (OUTPATIENT)
Dept: INTERNAL MEDICINE | Facility: CLINIC | Age: 60
End: 2020-01-01

## 2020-01-01 VITALS
DIASTOLIC BLOOD PRESSURE: 80 MMHG | BODY MASS INDEX: 47.74 KG/M2 | HEIGHT: 68 IN | HEART RATE: 67 BPM | SYSTOLIC BLOOD PRESSURE: 130 MMHG | WEIGHT: 315 LBS | RESPIRATION RATE: 16 BRPM

## 2020-01-01 VITALS
BODY MASS INDEX: 47.74 KG/M2 | DIASTOLIC BLOOD PRESSURE: 90 MMHG | HEIGHT: 68 IN | SYSTOLIC BLOOD PRESSURE: 130 MMHG | RESPIRATION RATE: 15 BRPM | HEART RATE: 74 BPM | WEIGHT: 315 LBS

## 2020-01-01 VITALS
WEIGHT: 315 LBS | HEART RATE: 67 BPM | SYSTOLIC BLOOD PRESSURE: 130 MMHG | DIASTOLIC BLOOD PRESSURE: 80 MMHG | BODY MASS INDEX: 58.08 KG/M2 | RESPIRATION RATE: 16 BRPM

## 2020-01-01 DIAGNOSIS — M25.569 PAIN IN UNSPECIFIED KNEE: ICD-10-CM

## 2020-01-01 DIAGNOSIS — E66.01 MORBID (SEVERE) OBESITY DUE TO EXCESS CALORIES: ICD-10-CM

## 2020-01-01 DIAGNOSIS — E78.5 HYPERLIPIDEMIA, UNSPECIFIED: ICD-10-CM

## 2020-01-01 DIAGNOSIS — Z78.9 OTHER SPECIFIED HEALTH STATUS: ICD-10-CM

## 2020-01-01 DIAGNOSIS — E11.9 TYPE 2 DIABETES MELLITUS W/OUT COMPLICATIONS: ICD-10-CM

## 2020-01-01 DIAGNOSIS — Z23 ENCOUNTER FOR IMMUNIZATION: ICD-10-CM

## 2020-01-01 DIAGNOSIS — G47.30 SLEEP APNEA, UNSPECIFIED: ICD-10-CM

## 2020-01-01 DIAGNOSIS — E55.9 VITAMIN D DEFICIENCY, UNSPECIFIED: ICD-10-CM

## 2020-01-01 DIAGNOSIS — N43.3 HYDROCELE, UNSPECIFIED: ICD-10-CM

## 2020-01-01 DIAGNOSIS — I48.91 UNSPECIFIED ATRIAL FIBRILLATION: ICD-10-CM

## 2020-01-01 DIAGNOSIS — K21.9 GASTRO-ESOPHAGEAL REFLUX DISEASE W/OUT ESOPHAGITIS: ICD-10-CM

## 2020-01-01 DIAGNOSIS — I47.2 VENTRICULAR TACHYCARDIA: ICD-10-CM

## 2020-01-01 DIAGNOSIS — I44.1 ATRIOVENTRICULAR BLOCK, SECOND DEGREE: ICD-10-CM

## 2020-01-01 DIAGNOSIS — I10 ESSENTIAL (PRIMARY) HYPERTENSION: ICD-10-CM

## 2020-01-01 DIAGNOSIS — I44.30 UNSPECIFIED ATRIOVENTRICULAR BLOCK: ICD-10-CM

## 2020-01-01 DIAGNOSIS — D50.9 IRON DEFICIENCY ANEMIA, UNSPECIFIED: ICD-10-CM

## 2020-01-01 DIAGNOSIS — E53.8 DEFICIENCY OF OTHER SPECIFIED B GROUP VITAMINS: ICD-10-CM

## 2020-01-01 DIAGNOSIS — H34.8130 CENTRAL RETINAL VEIN OCCLUSION, BILATERAL, WITH MACULAR EDEMA: ICD-10-CM

## 2020-01-01 DIAGNOSIS — H33.21 SEROUS RETINAL DETACHMENT, RIGHT EYE: ICD-10-CM

## 2020-01-01 DIAGNOSIS — R60.0 LOCALIZED EDEMA: ICD-10-CM

## 2020-01-01 DIAGNOSIS — Z95.0 PRESENCE OF CARDIAC PACEMAKER: ICD-10-CM

## 2020-01-01 LAB
25(OH)D3 SERPL-MCNC: 28.7 NG/ML
25(OH)D3 SERPL-MCNC: 32.7 NG/ML
ALBUMIN SERPL ELPH-MCNC: 3.6 G/DL
ALBUMIN SERPL ELPH-MCNC: 3.7 G/DL
ALP BLD-CCNC: 75 U/L
ALP BLD-CCNC: 75 U/L
ALT SERPL-CCNC: 18 U/L
ALT SERPL-CCNC: 20 U/L
ANION GAP SERPL CALC-SCNC: 10 MMOL/L
ANION GAP SERPL CALC-SCNC: 9 MMOL/L
APPEARANCE: CLEAR
AST SERPL-CCNC: 17 U/L
AST SERPL-CCNC: 20 U/L
BASOPHILS # BLD AUTO: 0.03 K/UL
BASOPHILS # BLD AUTO: 0.03 K/UL
BASOPHILS NFR BLD AUTO: 0.3 %
BASOPHILS NFR BLD AUTO: 0.4 %
BILIRUB SERPL-MCNC: 0.2 MG/DL
BILIRUB SERPL-MCNC: 0.4 MG/DL
BILIRUBIN URINE: NEGATIVE
BLOOD URINE: NEGATIVE
BUN SERPL-MCNC: 14 MG/DL
BUN SERPL-MCNC: 15 MG/DL
CALCIUM SERPL-MCNC: 8.7 MG/DL
CALCIUM SERPL-MCNC: 8.8 MG/DL
CHLORIDE SERPL-SCNC: 98 MMOL/L
CHLORIDE SERPL-SCNC: 98 MMOL/L
CHOLEST SERPL-MCNC: 226 MG/DL
CHOLEST SERPL-MCNC: 236 MG/DL
CHOLEST/HDLC SERPL: 3.4 RATIO
CHOLEST/HDLC SERPL: 3.5 RATIO
CO2 SERPL-SCNC: 27 MMOL/L
CO2 SERPL-SCNC: 32 MMOL/L
COLOR: YELLOW
CREAT SERPL-MCNC: 1.01 MG/DL
CREAT SERPL-MCNC: 1.05 MG/DL
CREAT SPEC-SCNC: 104 MG/DL
EOSINOPHIL # BLD AUTO: 0.19 K/UL
EOSINOPHIL # BLD AUTO: 0.28 K/UL
EOSINOPHIL NFR BLD AUTO: 2.6 %
EOSINOPHIL NFR BLD AUTO: 3.3 %
ESTIMATED AVERAGE GLUCOSE: 163 MG/DL
ESTIMATED AVERAGE GLUCOSE: 166 MG/DL
FERRITIN SERPL-MCNC: 31 NG/ML
FERRITIN SERPL-MCNC: 31 NG/ML
FOLATE SERPL-MCNC: 10.1 NG/ML
FOLATE SERPL-MCNC: 5.8 NG/ML
GLUCOSE QUALITATIVE U: NEGATIVE
GLUCOSE SERPL-MCNC: 129 MG/DL
GLUCOSE SERPL-MCNC: 133 MG/DL
HBA1C MFR BLD HPLC: 7.3 %
HBA1C MFR BLD HPLC: 7.4 %
HCT VFR BLD CALC: 43.8 %
HCT VFR BLD CALC: 44.1 %
HDLC SERPL-MCNC: 66 MG/DL
HDLC SERPL-MCNC: 67 MG/DL
HEMOCCULT STL QL IA: NEGATIVE
HGB BLD-MCNC: 12.5 G/DL
HGB BLD-MCNC: 12.6 G/DL
IMM GRANULOCYTES NFR BLD AUTO: 0.3 %
IMM GRANULOCYTES NFR BLD AUTO: 0.3 %
IRON SATN MFR SERPL: 9 %
IRON SATN MFR SERPL: 9 %
IRON SERPL-MCNC: 35 UG/DL
IRON SERPL-MCNC: 38 UG/DL
KETONES URINE: NEGATIVE
LDLC SERPL CALC-MCNC: 143 MG/DL
LDLC SERPL CALC-MCNC: 149 MG/DL
LEUKOCYTE ESTERASE URINE: NEGATIVE
LYMPHOCYTES # BLD AUTO: 1.57 K/UL
LYMPHOCYTES # BLD AUTO: 1.76 K/UL
LYMPHOCYTES NFR BLD AUTO: 20.5 %
LYMPHOCYTES NFR BLD AUTO: 21.4 %
MAN DIFF?: NORMAL
MAN DIFF?: NORMAL
MCHC RBC-ENTMCNC: 25.2 PG
MCHC RBC-ENTMCNC: 26.8 PG
MCHC RBC-ENTMCNC: 28.3 GM/DL
MCHC RBC-ENTMCNC: 28.8 GM/DL
MCV RBC AUTO: 87.6 FL
MCV RBC AUTO: 94.4 FL
MICROALBUMIN 24H UR DL<=1MG/L-MCNC: <1.2 MG/DL
MICROALBUMIN/CREAT 24H UR-RTO: NORMAL MG/G
MONOCYTES # BLD AUTO: 0.64 K/UL
MONOCYTES # BLD AUTO: 0.71 K/UL
MONOCYTES NFR BLD AUTO: 8.3 %
MONOCYTES NFR BLD AUTO: 8.7 %
NEUTROPHILS # BLD AUTO: 4.87 K/UL
NEUTROPHILS # BLD AUTO: 5.78 K/UL
NEUTROPHILS NFR BLD AUTO: 66.6 %
NEUTROPHILS NFR BLD AUTO: 67.3 %
NITRITE URINE: NEGATIVE
PH URINE: 7
PLATELET # BLD AUTO: 162 K/UL
PLATELET # BLD AUTO: 174 K/UL
POTASSIUM SERPL-SCNC: 4.6 MMOL/L
POTASSIUM SERPL-SCNC: 4.9 MMOL/L
PROT SERPL-MCNC: 6.8 G/DL
PROT SERPL-MCNC: 7.1 G/DL
PROTEIN URINE: NEGATIVE
RBC # BLD: 4.67 M/UL
RBC # BLD: 5 M/UL
RBC # FLD: 16.5 %
RBC # FLD: 16.8 %
SODIUM SERPL-SCNC: 135 MMOL/L
SODIUM SERPL-SCNC: 138 MMOL/L
SPECIFIC GRAVITY URINE: 1.02
TIBC SERPL-MCNC: 404 UG/DL
TIBC SERPL-MCNC: 409 UG/DL
TRIGL SERPL-MCNC: 85 MG/DL
TRIGL SERPL-MCNC: 99 MG/DL
TSH SERPL-ACNC: 2.45 UIU/ML
UIBC SERPL-MCNC: 369 UG/DL
UIBC SERPL-MCNC: 371 UG/DL
UROBILINOGEN URINE: NORMAL
VIT B12 SERPL-MCNC: 241 PG/ML
VIT B12 SERPL-MCNC: 279 PG/ML
WBC # FLD AUTO: 7.32 K/UL
WBC # FLD AUTO: 8.59 K/UL

## 2020-01-01 PROCEDURE — 99214 OFFICE O/P EST MOD 30 MIN: CPT | Mod: 25

## 2020-01-01 PROCEDURE — 90686 IIV4 VACC NO PRSV 0.5 ML IM: CPT

## 2020-01-01 PROCEDURE — 93296 REM INTERROG EVL PM/IDS: CPT

## 2020-01-01 PROCEDURE — G0008: CPT

## 2020-01-01 PROCEDURE — 93294 REM INTERROG EVL PM/LDLS PM: CPT

## 2020-01-01 PROCEDURE — 36415 COLL VENOUS BLD VENIPUNCTURE: CPT

## 2020-01-01 RX ORDER — APIXABAN 5 MG/1
5 TABLET, FILM COATED ORAL
Qty: 90 | Refills: 3 | Status: ACTIVE | COMMUNITY
Start: 2019-12-31 | End: 1900-01-01

## 2020-01-01 RX ORDER — FUROSEMIDE 40 MG/1
40 TABLET ORAL DAILY
Qty: 90 | Refills: 3 | Status: ACTIVE | COMMUNITY
Start: 2019-08-16 | End: 1900-01-01

## 2020-01-01 RX ORDER — HYDROCHLOROTHIAZIDE 25 MG/1
25 TABLET ORAL
Qty: 30 | Refills: 0 | Status: ACTIVE | COMMUNITY
Start: 2018-07-18 | End: 1900-01-01

## 2020-01-01 RX ORDER — POTASSIUM CHLORIDE 1500 MG/1
20 TABLET, EXTENDED RELEASE ORAL
Qty: 30 | Refills: 2 | Status: ACTIVE | COMMUNITY
Start: 2019-08-16 | End: 1900-01-01

## 2020-01-01 RX ORDER — LISINOPRIL 40 MG/1
40 TABLET ORAL
Qty: 30 | Refills: 3 | Status: ACTIVE | COMMUNITY
Start: 2019-09-02 | End: 1900-01-01

## 2020-01-01 RX ORDER — POTASSIUM CHLORIDE 1500 MG/1
20 TABLET, EXTENDED RELEASE ORAL DAILY
Qty: 90 | Refills: 3 | Status: ACTIVE | COMMUNITY
Start: 2020-01-01 | End: 1900-01-01

## 2020-01-01 RX ORDER — OMEPRAZOLE 20 MG/1
20 CAPSULE, DELAYED RELEASE ORAL
Qty: 30 | Refills: 3 | Status: ACTIVE | COMMUNITY
Start: 1900-01-01 | End: 1900-01-01

## 2020-01-01 RX ORDER — FUROSEMIDE 20 MG/1
20 TABLET ORAL
Qty: 30 | Refills: 2 | Status: ACTIVE | COMMUNITY
Start: 2020-01-01 | End: 1900-01-01

## 2020-01-02 ENCOUNTER — APPOINTMENT (OUTPATIENT)
Dept: INTERNAL MEDICINE | Facility: CLINIC | Age: 60
End: 2020-01-02
Payer: MEDICAID

## 2020-01-02 VITALS
BODY MASS INDEX: 47.74 KG/M2 | HEART RATE: 74 BPM | WEIGHT: 315 LBS | HEIGHT: 68 IN | OXYGEN SATURATION: 95 % | DIASTOLIC BLOOD PRESSURE: 86 MMHG | RESPIRATION RATE: 13 BRPM | SYSTOLIC BLOOD PRESSURE: 152 MMHG

## 2020-01-02 DIAGNOSIS — Z01.818 ENCOUNTER FOR OTHER PREPROCEDURAL EXAMINATION: ICD-10-CM

## 2020-01-02 DIAGNOSIS — Z87.898 PERSONAL HISTORY OF OTHER SPECIFIED CONDITIONS: ICD-10-CM

## 2020-01-02 DIAGNOSIS — Z00.00 ENCOUNTER FOR GENERAL ADULT MEDICAL EXAMINATION W/OUT ABNORMAL FINDINGS: ICD-10-CM

## 2020-01-02 DIAGNOSIS — Z87.19 PERSONAL HISTORY OF OTHER DISEASES OF THE DIGESTIVE SYSTEM: ICD-10-CM

## 2020-01-02 DIAGNOSIS — Z98.84 BARIATRIC SURGERY STATUS: ICD-10-CM

## 2020-01-02 DIAGNOSIS — N50.89 OTHER SPECIFIED DISORDERS OF THE MALE GENITAL ORGANS: ICD-10-CM

## 2020-01-02 PROCEDURE — 99396 PREV VISIT EST AGE 40-64: CPT | Mod: 25

## 2020-01-02 PROCEDURE — 90686 IIV4 VACC NO PRSV 0.5 ML IM: CPT

## 2020-01-02 PROCEDURE — 36415 COLL VENOUS BLD VENIPUNCTURE: CPT

## 2020-01-02 PROCEDURE — G0008: CPT

## 2020-01-02 RX ORDER — POTASSIUM CHLORIDE 1500 MG/1
20 TABLET, FILM COATED, EXTENDED RELEASE ORAL
Qty: 60 | Refills: 2 | Status: DISCONTINUED | COMMUNITY
Start: 2018-10-16 | End: 2020-01-02

## 2020-01-02 RX ORDER — FUROSEMIDE 40 MG/1
40 TABLET ORAL
Qty: 90 | Refills: 1 | Status: DISCONTINUED | COMMUNITY
Start: 2018-10-16 | End: 2020-01-02

## 2020-01-02 RX ORDER — LISINOPRIL 20 MG/1
20 TABLET ORAL DAILY
Qty: 90 | Refills: 1 | Status: DISCONTINUED | COMMUNITY
Start: 2018-08-05 | End: 2020-01-02

## 2020-01-02 NOTE — PHYSICAL EXAM
[Well Nourished] : well nourished [No Acute Distress] : no acute distress [Well Developed] : well developed [Well-Appearing] : well-appearing [Normal Sclera/Conjunctiva] : normal sclera/conjunctiva [PERRL] : pupils equal round and reactive to light [EOMI] : extraocular movements intact [Normal Oropharynx] : the oropharynx was normal [Normal Outer Ear/Nose] : the outer ears and nose were normal in appearance [No JVD] : no jugular venous distention [No Lymphadenopathy] : no lymphadenopathy [Thyroid Normal, No Nodules] : the thyroid was normal and there were no nodules present [No Respiratory Distress] : no respiratory distress  [Supple] : supple [No Accessory Muscle Use] : no accessory muscle use [Clear to Auscultation] : lungs were clear to auscultation bilaterally [Normal Rate] : normal rate  [Regular Rhythm] : with a regular rhythm [Normal S1, S2] : normal S1 and S2 [No Murmur] : no murmur heard [No Carotid Bruits] : no carotid bruits [No Abdominal Bruit] : a ~M bruit was not heard ~T in the abdomen [Pedal Pulses Present] : the pedal pulses are present [No Palpable Aorta] : no palpable aorta [Soft] : abdomen soft [Non Tender] : non-tender [Non-distended] : non-distended [No HSM] : no HSM [No Masses] : no abdominal mass palpated [Normal Bowel Sounds] : normal bowel sounds [Normal Posterior Cervical Nodes] : no posterior cervical lymphadenopathy [No CVA Tenderness] : no CVA  tenderness [Normal Anterior Cervical Nodes] : no anterior cervical lymphadenopathy [No Joint Swelling] : no joint swelling [No Spinal Tenderness] : no spinal tenderness [Grossly Normal Strength/Tone] : grossly normal strength/tone [No Rash] : no rash [Coordination Grossly Intact] : coordination grossly intact [Normal Gait] : normal gait [No Focal Deficits] : no focal deficits [Speech Grossly Normal] : speech grossly normal [Normal Affect] : the affect was normal [Memory Grossly Normal] : memory grossly normal [Alert and Oriented x3] : oriented to person, place, and time [Normal Mood] : the mood was normal [Normal Insight/Judgement] : insight and judgment were intact [de-identified] : +++ edema [de-identified] : obese

## 2020-01-02 NOTE — COUNSELING
[Hazards of at-risk alcohol use discussed] : Hazards of at-risk alcohol use discussed [AUDIT-C Screening administered and reviewed] : AUDIT-C Screening administered and reviewed [Strategies to reduce or eliminate alcohol use discussed] : Strategies to reduce or eliminate alcohol use discussed [Potential consequences of obesity discussed] : Potential consequences of obesity discussed [Encouraged to increase physical activity] : Encouraged to increase physical activity [Benefits of weight loss discussed] : Benefits of weight loss discussed [Decrease Portions] : decrease portions [None] : None [Good understanding] : Patient has a good understanding of lifestyle changes and steps needed to achieve self management goal

## 2020-01-02 NOTE — HISTORY OF PRESENT ILLNESS
[FreeTextEntry1] : Annual PE\par dm, htn, gerd, obesity, edema, b12/iron/vit d def\par mobitz 2 hrt block s/p ppm. [de-identified] : 60 y/o male with a hx of obesity - s/p bariatric surg in the past, hx htn and dm, glaucoma s/p surgery, edema, gerd, b12 def, vit D def, iron def, s/p admission 5/28/19 with syncopal episode. Was found to be in 2nd degree HB - Mobitz 2. Was transferred to [facility] for PPM. \par \par Has followed up with EPS.  Rx had been sent for a/c - eliquis.\par no CV sx since he was in the hospital.  Reports poss inc in edema.\par Has been taking the meds - no taking PPi or statin.  Not taking the supplements.  \par s/p surgery for retinal detachment on the right. With continued blurry vision at the right eye - has f/u appt tomorrow\par diet has been a bit better. \par Has been exercising with cycling nightly.\par wt - ? gain \par no polyuria, polydipsia, polyphagia\par no noted cv sx except the improved le edema. no cp, palpitations, dizziness, bruce, pnd, orthopnea. \par no noted bleeding, bruising. \par no neuro sx\par Reprots GERD sx.   \par no musculoskeletal pains\par Mood has been good.  Store has been busy.\par Reports dec libido\par \par for flu vaccine\par \par colon - has never had.  starting eliquis - will refer for FIT

## 2020-01-02 NOTE — HEALTH RISK ASSESSMENT
[Yes] : Yes [2 - 3 times a week (3 pts)] : 2 - 3  times a week (3 points) [1 or 2 (0 pts)] : 1 or 2 (0 points) [Never (0 pts)] : Never (0 points) [No] : In the past 12 months have you used drugs other than those required for medical reasons? No [No falls in past year] : Patient reported no falls in the past year [0] : 2) Feeling down, depressed, or hopeless: Not at all (0) [None] : None [Employed] : employed [With Family] : lives with family [] :  [Sexually Active] : sexually active [Feels Safe at Home] : Feels safe at home [Fully functional (bathing, dressing, toileting, transferring, walking, feeding)] : Fully functional (bathing, dressing, toileting, transferring, walking, feeding) [Fully functional (using the telephone, shopping, preparing meals, housekeeping, doing laundry, using] : Fully functional and needs no help or supervision to perform IADLs (using the telephone, shopping, preparing meals, housekeeping, doing laundry, using transportation, managing medications and managing finances) [Reports changes in vision] : Reports changes in vision [Carbon Monoxide Detector] : carbon monoxide detector [Smoke Detector] : smoke detector [Seat Belt] :  uses seat belt [Sunscreen] : uses sunscreen [] : No [[id number]] : 0 [EyeExamDate] : 1/20 [Change in mental status noted] : No change in mental status noted [High Risk Behavior] : no high risk behavior [Reports changes in dental health] : Reports no changes in dental health [Reports changes in hearing] : Reports no changes in hearing

## 2020-01-02 NOTE — REVIEW OF SYSTEMS
[Vision Problems] : vision problems [Lower Ext Edema] : lower extremity edema [Negative] : Neurological [Fever] : no fever [Chills] : no chills [Fatigue] : no fatigue [Night Sweats] : no night sweats [Recent Change In Weight] : ~T no recent weight change [Discharge] : no discharge [Redness] : no redness [Pain] : no pain [Itching] : no itching [Dryness] : no dryness [Chest Pain] : no chest pain [Palpitations] : no palpitations [Claudication] : no  leg claudication [Orthopena] : no orthopnea [Joint Stiffness] : no joint stiffness [Muscle Pain] : no muscle pain [Joint Pain] : no joint pain [Muscle Weakness] : no muscle weakness [Back Pain] : no back pain [Joint Swelling] : no joint swelling [Skin Rash] : no skin rash [Suicidal] : not suicidal [Insomnia] : no insomnia [Anxiety] : no anxiety [Depression] : no depression [de-identified] : occ wakes up.

## 2020-01-03 LAB
25(OH)D3 SERPL-MCNC: 29.3 NG/ML
ALBUMIN SERPL ELPH-MCNC: 3.9 G/DL
ALP BLD-CCNC: 78 U/L
ALT SERPL-CCNC: 13 U/L
ANION GAP SERPL CALC-SCNC: 13 MMOL/L
AST SERPL-CCNC: 18 U/L
BASOPHILS # BLD AUTO: 0.04 K/UL
BASOPHILS NFR BLD AUTO: 0.5 %
BILIRUB SERPL-MCNC: 0.3 MG/DL
BUN SERPL-MCNC: 21 MG/DL
CALCIUM SERPL-MCNC: 9 MG/DL
CHLORIDE SERPL-SCNC: 96 MMOL/L
CHOLEST SERPL-MCNC: 217 MG/DL
CHOLEST/HDLC SERPL: 2.8 RATIO
CO2 SERPL-SCNC: 30 MMOL/L
CREAT SERPL-MCNC: 1.12 MG/DL
CREAT SPEC-SCNC: 90 MG/DL
EOSINOPHIL # BLD AUTO: 0.25 K/UL
EOSINOPHIL NFR BLD AUTO: 3.2 %
ESTIMATED AVERAGE GLUCOSE: 163 MG/DL
FERRITIN SERPL-MCNC: 50 NG/ML
FOLATE SERPL-MCNC: 8.9 NG/ML
GLUCOSE SERPL-MCNC: 110 MG/DL
HBA1C MFR BLD HPLC: 7.3 %
HCT VFR BLD CALC: 44.1 %
HDLC SERPL-MCNC: 78 MG/DL
HGB BLD-MCNC: 13.3 G/DL
IMM GRANULOCYTES NFR BLD AUTO: 0.4 %
IRON SATN MFR SERPL: 12 %
IRON SERPL-MCNC: 48 UG/DL
LDLC SERPL CALC-MCNC: 123 MG/DL
LYMPHOCYTES # BLD AUTO: 1.92 K/UL
LYMPHOCYTES NFR BLD AUTO: 24.4 %
MAN DIFF?: NORMAL
MCHC RBC-ENTMCNC: 28 PG
MCHC RBC-ENTMCNC: 30.2 GM/DL
MCV RBC AUTO: 92.8 FL
MICROALBUMIN 24H UR DL<=1MG/L-MCNC: <1.2 MG/DL
MICROALBUMIN/CREAT 24H UR-RTO: NORMAL MG/G
MONOCYTES # BLD AUTO: 0.61 K/UL
MONOCYTES NFR BLD AUTO: 7.8 %
NEUTROPHILS # BLD AUTO: 5.02 K/UL
NEUTROPHILS NFR BLD AUTO: 63.7 %
PLATELET # BLD AUTO: 151 K/UL
POTASSIUM SERPL-SCNC: 4.6 MMOL/L
PROT SERPL-MCNC: 7.2 G/DL
PSA SERPL-MCNC: 0.17 NG/ML
RBC # BLD: 4.75 M/UL
RBC # FLD: 14.8 %
SODIUM SERPL-SCNC: 139 MMOL/L
TIBC SERPL-MCNC: 415 UG/DL
TRIGL SERPL-MCNC: 82 MG/DL
TSH SERPL-ACNC: 2.22 UIU/ML
UIBC SERPL-MCNC: 367 UG/DL
VIT B12 SERPL-MCNC: 276 PG/ML
WBC # FLD AUTO: 7.87 K/UL

## 2020-01-03 RX ORDER — METFORMIN HYDROCHLORIDE 500 MG/1
500 TABLET, COATED ORAL TWICE DAILY
Qty: 60 | Refills: 2 | Status: ACTIVE | COMMUNITY
Start: 2020-01-03 | End: 1900-01-01

## 2020-01-16 RX ORDER — METOPROLOL SUCCINATE 25 MG/1
25 TABLET, EXTENDED RELEASE ORAL DAILY
Qty: 30 | Refills: 2 | Status: ACTIVE | COMMUNITY
Start: 2020-01-16 | End: 1900-01-01

## 2020-01-17 ENCOUNTER — APPOINTMENT (OUTPATIENT)
Dept: ELECTROPHYSIOLOGY | Facility: CLINIC | Age: 60
End: 2020-01-17
Payer: MEDICAID

## 2020-01-17 PROCEDURE — 93294 REM INTERROG EVL PM/LDLS PM: CPT

## 2020-01-17 PROCEDURE — 93296 REM INTERROG EVL PM/IDS: CPT

## 2020-01-29 NOTE — H&P CARDIOLOGY - DATE:
31-May-2019 Pt arrives c/o lower back pain x 2 days, pt states that pain is in lower back.  Pt denies any injury

## 2020-06-04 NOTE — REVIEW OF SYSTEMS
[Vision Problems] : vision problems [Lower Ext Edema] : lower extremity edema [Negative] : Heme/Lymph [Fever] : no fever [Chills] : no chills [Fatigue] : no fatigue [Night Sweats] : no night sweats [Recent Change In Weight] : ~T no recent weight change [Discharge] : no discharge [Pain] : no pain [Redness] : no redness [Dryness] : no dryness [Itching] : no itching [Chest Pain] : no chest pain [Claudication] : no  leg claudication [Palpitations] : no palpitations [Joint Pain] : no joint pain [Orthopena] : no orthopnea [Joint Stiffness] : no joint stiffness [Muscle Pain] : no muscle pain [Muscle Weakness] : no muscle weakness [Back Pain] : no back pain [Joint Swelling] : no joint swelling [Skin Rash] : no skin rash [Suicidal] : not suicidal [Insomnia] : no insomnia [Anxiety] : no anxiety [Depression] : no depression [de-identified] : occ wakes up.

## 2020-06-04 NOTE — HISTORY OF PRESENT ILLNESS
[FreeTextEntry1] : dm, htn, gerd, obesity, edema, b12/iron/vit d def\par mobitz 2 hrt block s/p ppm [de-identified] : 59 y/o male with a hx of obesity - s/p bariatric surg in the past, hx htn and dm, glaucoma s/p surgery, edema, gerd, b12 def, vit D def, iron def, s/p admission 5/28/19 with syncopal episode. Was found to be in 2nd degree HB - Mobitz 2. Was transferred to [facility] for PPM. \par \par Has followed up with EPS. Rx had been on - eliquis.  Overdue to f/u with cardiology and EPS\par no CV sx since he was in the hospital. Reports poss inc in edema.\par Has been taking the meds - TAking 5-6 meds - does not know names - reports taking dm and bp meds.,  taking the eliquis.   \par s/p surgery for retinal detachment on the right. With continued blurry vision at the right eye - has been following\par diet has been a bit better. \par Has been exercising with cycling nightly.\par wt - gain \par BP - variable.  nl to high\par no polyuria, polydipsia, polyphagia\par no noted cv sx except the improved le edema. no cp, palpitations, dizziness, bruce, pnd, orthopnea. \par no noted bleeding, bruising. \par no neuro sx\par Reprots GERD sx. \par no musculoskeletal pains\par Mood has been good. Store has been busy.\par Reports dec libido\par \par for flu vaccine\par \par colon - has never had. starting eliquis - will refer for FIT

## 2020-06-04 NOTE — HEALTH RISK ASSESSMENT
[Yes] : Yes [2 - 3 times a week (3 pts)] : 2 - 3  times a week (3 points) [1 or 2 (0 pts)] : 1 or 2 (0 points) [Never (0 pts)] : Never (0 points) [No] : In the past 12 months have you used drugs other than those required for medical reasons? No [0] : 2) Feeling down, depressed, or hopeless: Not at all (0) [EyeExamDate] : 4/20 [] : No [Audit-CScore] : 3 [[id number]] : 0

## 2020-06-04 NOTE — PHYSICAL EXAM
[No Acute Distress] : no acute distress [Well Nourished] : well nourished [Well Developed] : well developed [Well-Appearing] : well-appearing [Normal Sclera/Conjunctiva] : normal sclera/conjunctiva [PERRL] : pupils equal round and reactive to light [EOMI] : extraocular movements intact [Normal Outer Ear/Nose] : the outer ears and nose were normal in appearance [Normal Oropharynx] : the oropharynx was normal [No JVD] : no jugular venous distention [Supple] : supple [No Lymphadenopathy] : no lymphadenopathy [Thyroid Normal, No Nodules] : the thyroid was normal and there were no nodules present [Clear to Auscultation] : lungs were clear to auscultation bilaterally [No Respiratory Distress] : no respiratory distress  [No Accessory Muscle Use] : no accessory muscle use [Normal Rate] : normal rate  [Regular Rhythm] : with a regular rhythm [Normal S1, S2] : normal S1 and S2 [No Murmur] : no murmur heard [No Carotid Bruits] : no carotid bruits [No Abdominal Bruit] : a ~M bruit was not heard ~T in the abdomen [Pedal Pulses Present] : the pedal pulses are present [No Palpable Aorta] : no palpable aorta [Soft] : abdomen soft [Non Tender] : non-tender [Non-distended] : non-distended [No Masses] : no abdominal mass palpated [No HSM] : no HSM [Normal Bowel Sounds] : normal bowel sounds [Normal Posterior Cervical Nodes] : no posterior cervical lymphadenopathy [Normal Anterior Cervical Nodes] : no anterior cervical lymphadenopathy [No CVA Tenderness] : no CVA  tenderness [No Spinal Tenderness] : no spinal tenderness [No Joint Swelling] : no joint swelling [Grossly Normal Strength/Tone] : grossly normal strength/tone [Coordination Grossly Intact] : coordination grossly intact [Normal Gait] : normal gait [No Focal Deficits] : no focal deficits [Speech Grossly Normal] : speech grossly normal [Memory Grossly Normal] : memory grossly normal [Normal Affect] : the affect was normal [Normal Mood] : the mood was normal [Alert and Oriented x3] : oriented to person, place, and time [Normal Insight/Judgement] : insight and judgment were intact [de-identified] : obese [de-identified] : +++ edema

## 2020-06-04 NOTE — COUNSELING
[Potential consequences of obesity discussed] : Potential consequences of obesity discussed [Benefits of weight loss discussed] : Benefits of weight loss discussed [Encouraged to increase physical activity] : Encouraged to increase physical activity [Decrease Portions] : decrease portions [None] : None [Good understanding] : Patient has a good understanding of lifestyle changes and steps needed to achieve self management goal

## 2020-08-31 PROBLEM — D50.9 IRON DEFICIENCY ANEMIA: Status: ACTIVE | Noted: 2018-08-05

## 2020-08-31 PROBLEM — I10 HYPERTENSION: Status: ACTIVE | Noted: 2018-07-18

## 2020-08-31 PROBLEM — R60.0 BILATERAL EDEMA OF LOWER EXTREMITY: Status: ACTIVE | Noted: 2018-07-18

## 2020-08-31 PROBLEM — N43.3 BILATERAL HYDROCELE: Status: ACTIVE | Noted: 2018-08-30

## 2020-08-31 PROBLEM — E53.8 VITAMIN B12 DEFICIENCY: Status: ACTIVE | Noted: 2018-08-05

## 2020-08-31 PROBLEM — I47.2 NSVT (NONSUSTAINED VENTRICULAR TACHYCARDIA): Status: ACTIVE | Noted: 2020-01-16

## 2020-08-31 PROBLEM — K21.9 GERD (GASTROESOPHAGEAL REFLUX DISEASE): Status: ACTIVE | Noted: 2018-07-18

## 2020-08-31 PROBLEM — Z23 FLU VACCINE NEED: Status: ACTIVE | Noted: 2019-02-04

## 2020-08-31 PROBLEM — G47.30 SLEEP APNEA: Status: ACTIVE | Noted: 2018-10-16

## 2020-08-31 PROBLEM — I44.30 HEART BLOCK, AV: Status: ACTIVE | Noted: 2018-10-16

## 2020-08-31 PROBLEM — Z95.0 PACEMAKER: Status: ACTIVE | Noted: 2019-07-29

## 2020-08-31 PROBLEM — E11.9 CONTROLLED TYPE 2 DIABETES MELLITUS WITHOUT COMPLICATION: Status: ACTIVE | Noted: 2018-07-18

## 2020-08-31 PROBLEM — I44.1 MOBITZ TYPE 2 SECOND DEGREE HEART BLOCK: Status: ACTIVE | Noted: 2019-07-29

## 2020-08-31 PROBLEM — E78.5 DYSLIPIDEMIA: Status: ACTIVE | Noted: 2019-02-06

## 2020-08-31 PROBLEM — M25.569 KNEE PAIN: Status: ACTIVE | Noted: 2019-02-04

## 2020-08-31 PROBLEM — E55.9 VITAMIN D DEFICIENCY: Status: ACTIVE | Noted: 2018-08-05

## 2020-08-31 PROBLEM — Z78.9 NEVER SMOKED TOBACCO: Status: ACTIVE | Noted: 2018-07-18

## 2020-08-31 PROBLEM — H33.21 RIGHT RETINAL DETACHMENT: Status: ACTIVE | Noted: 2018-09-07

## 2020-08-31 PROBLEM — I48.91 ATRIAL FIBRILLATION: Status: ACTIVE | Noted: 2019-12-31

## 2020-08-31 PROBLEM — E66.01 MORBID OBESITY: Status: ACTIVE | Noted: 2019-02-25

## 2020-08-31 NOTE — HISTORY OF PRESENT ILLNESS
[FreeTextEntry1] : dm, htn, gerd, obesity, edema, b12/iron/vit d def\par mobitz 2 hrt block s/p ppm [de-identified] : 59 y/o male with a hx of obesity - s/p bariatric surg in the past, hx htn and dm, glaucoma s/p surgery, edema, gerd, b12 def, vit D def, iron def, s/p admission 5/28/19 with syncopal episode. Was found to be in 2nd degree HB - Mobitz 2. Was transferred to [facility] for PPM. \par Overdue to f/u with cardiology and EPS - noted that 2 mo ago with svt with af/flutter - was not taking metoprolol at that time.  Reprots that he has been taking.  Taking the eliquis.\par no CV sx since he was in the hospital. Still with edema.\par Has been taking the meds.   \par s/p surgery for retinal detachment on the right. With continued blurry vision at the right eye - has been following - asking to go to new ophtho.  \par diet has been a bit better. \par Has been exercising with cycling nightly.  Has been increased.  some walking.  \par wt - \par BP - variable.  nl to high\par no polyuria, polydipsia, polyphagia\par no noted cv sx except the le edema. no cp, palpitations, dizziness, bruce, pnd, orthopnea. \par no noted bleeding, bruising. \par no neuro sx\par Reprots GERD sx. \par no musculoskeletal pains\par Mood has been good. Store has been busy.\par Reports dec libido\par Has been having daytime fatigue.  ? if snoring.  \par for flu vaccine\par \par colon - has never had. starting eliquis - will refer for FIT \par \par for flu vaccine

## 2020-08-31 NOTE — HEALTH RISK ASSESSMENT
[Yes] : Yes [2 - 3 times a week (3 pts)] : 2 - 3  times a week (3 points) [1 or 2 (0 pts)] : 1 or 2 (0 points) [Never (0 pts)] : Never (0 points) [No] : In the past 12 months have you used drugs other than those required for medical reasons? No [0] : 2) Feeling down, depressed, or hopeless: Not at all (0) [] : No [Audit-CScore] : 3 [[id number]] : 0

## 2020-08-31 NOTE — PHYSICAL EXAM
[No Acute Distress] : no acute distress [Well Nourished] : well nourished [Well Developed] : well developed [Well-Appearing] : well-appearing [Normal Sclera/Conjunctiva] : normal sclera/conjunctiva [PERRL] : pupils equal round and reactive to light [EOMI] : extraocular movements intact [Normal Outer Ear/Nose] : the outer ears and nose were normal in appearance [Normal Oropharynx] : the oropharynx was normal [No Lymphadenopathy] : no lymphadenopathy [No JVD] : no jugular venous distention [Supple] : supple [Thyroid Normal, No Nodules] : the thyroid was normal and there were no nodules present [No Respiratory Distress] : no respiratory distress  [No Accessory Muscle Use] : no accessory muscle use [Clear to Auscultation] : lungs were clear to auscultation bilaterally [Normal Rate] : normal rate  [Regular Rhythm] : with a regular rhythm [Normal S1, S2] : normal S1 and S2 [No Murmur] : no murmur heard [No Carotid Bruits] : no carotid bruits [No Abdominal Bruit] : a ~M bruit was not heard ~T in the abdomen [Pedal Pulses Present] : the pedal pulses are present [No Palpable Aorta] : no palpable aorta [Soft] : abdomen soft [Non Tender] : non-tender [Non-distended] : non-distended [No Masses] : no abdominal mass palpated [No HSM] : no HSM [Normal Bowel Sounds] : normal bowel sounds [Normal Posterior Cervical Nodes] : no posterior cervical lymphadenopathy [Normal Anterior Cervical Nodes] : no anterior cervical lymphadenopathy [No CVA Tenderness] : no CVA  tenderness [No Spinal Tenderness] : no spinal tenderness [No Joint Swelling] : no joint swelling [Grossly Normal Strength/Tone] : grossly normal strength/tone [Coordination Grossly Intact] : coordination grossly intact [No Focal Deficits] : no focal deficits [Normal Gait] : normal gait [Memory Grossly Normal] : memory grossly normal [Speech Grossly Normal] : speech grossly normal [Normal Affect] : the affect was normal [Alert and Oriented x3] : oriented to person, place, and time [Normal Mood] : the mood was normal [Normal Insight/Judgement] : insight and judgment were intact [de-identified] : obese [de-identified] : +++ edema

## 2020-08-31 NOTE — REVIEW OF SYSTEMS
[Fatigue] : fatigue [Vision Problems] : vision problems [Lower Ext Edema] : lower extremity edema [Negative] : Heme/Lymph [Fever] : no fever [Chills] : no chills [Night Sweats] : no night sweats [Recent Change In Weight] : ~T no recent weight change [Discharge] : no discharge [Redness] : no redness [Pain] : no pain [Dryness] : no dryness [Chest Pain] : no chest pain [Itching] : no itching [Palpitations] : no palpitations [Orthopena] : no orthopnea [Claudication] : no  leg claudication [Joint Pain] : no joint pain [Joint Stiffness] : no joint stiffness [Muscle Pain] : no muscle pain [Muscle Weakness] : no muscle weakness [Back Pain] : no back pain [Joint Swelling] : no joint swelling [Skin Rash] : no skin rash [Suicidal] : not suicidal [Insomnia] : no insomnia [Anxiety] : no anxiety [Depression] : no depression [de-identified] : occ wakes up.

## 2020-10-15 PROBLEM — H34.8130 CENTRAL RETINAL VEIN OCCLUSION WITH MACULAR EDEMA OF BOTH EYES: Status: ACTIVE | Noted: 2020-01-01

## 2020-10-19 NOTE — ED ADULT TRIAGE NOTE - WEIGHT METHOD
Medication:   Requested Prescriptions     Pending Prescriptions Disp Refills    rosuvastatin (CRESTOR) 10 MG tablet [Pharmacy Med Name: ROSUVASTATIN CALCIUM 10 MG TAB] 90 tablet 2     Sig: TAKE ONE TABLET BY MOUTH DAILY        Last Filled:      Patient Phone Number: [phone] (home)     Last appt: 8/31/2020   Next appt: Visit date not found    Last OARRS: No flowsheet data found. stated

## 2021-01-01 ENCOUNTER — NON-APPOINTMENT (OUTPATIENT)
Age: 61
End: 2021-01-01

## 2021-01-01 ENCOUNTER — APPOINTMENT (OUTPATIENT)
Dept: ELECTROPHYSIOLOGY | Facility: CLINIC | Age: 61
End: 2021-01-01

## 2021-01-26 ENCOUNTER — RX RENEWAL (OUTPATIENT)
Age: 61
End: 2021-01-26

## 2021-02-23 ENCOUNTER — APPOINTMENT (OUTPATIENT)
Dept: OPHTHALMOLOGY | Facility: CLINIC | Age: 61
End: 2021-02-23

## 2021-09-13 NOTE — COUNSELING
[Weight management counseling provided] : Weight management [Healthy eating counseling provided] : healthy eating [Activity counseling provided] : activity [Good understanding] : Patient has a good understanding of disease, goals and obesity follow-up plan [Low Fat Diet] : Low fat diet [Decrease Portions] : Decrease food portions [Low Salt Diet] : Low salt diet [Walking] : Walking [None] : None Advancement-Rotation Flap Text: The defect edges were debeveled with a #15 scalpel blade.  Given the location of the defect, shape of the defect and the proximity to free margins an advancement-rotation flap was deemed most appropriate.  Using a sterile surgical marker, an appropriate flap was drawn incorporating the defect and placing the expected incisions within the relaxed skin tension lines where possible. The area thus outlined was incised deep to adipose tissue with a #15 scalpel blade.  The skin margins were undermined to an appropriate distance in all directions utilizing iris scissors.

## 2024-04-11 NOTE — ED PROVIDER NOTE - NS ED MD DISPO DIVISION
OK to take Tylenol/Acetaminophen at 6'15pm  for pain and every 6 hours after as needed. OK to take Motrin/Ibuprofen at 6'15pm  for pain and every 6 hours after as needed. [facility]